# Patient Record
Sex: FEMALE | Race: WHITE | NOT HISPANIC OR LATINO | Employment: FULL TIME | URBAN - METROPOLITAN AREA
[De-identification: names, ages, dates, MRNs, and addresses within clinical notes are randomized per-mention and may not be internally consistent; named-entity substitution may affect disease eponyms.]

---

## 2019-09-12 ENCOUNTER — APPOINTMENT (EMERGENCY)
Dept: RADIOLOGY | Facility: HOSPITAL | Age: 54
End: 2019-09-12
Payer: COMMERCIAL

## 2019-09-12 ENCOUNTER — HOSPITAL ENCOUNTER (EMERGENCY)
Facility: HOSPITAL | Age: 54
Discharge: HOME | End: 2019-09-12
Attending: EMERGENCY MEDICINE | Admitting: EMERGENCY MEDICINE
Payer: COMMERCIAL

## 2019-09-12 VITALS
BODY MASS INDEX: 34.16 KG/M2 | SYSTOLIC BLOOD PRESSURE: 159 MMHG | DIASTOLIC BLOOD PRESSURE: 70 MMHG | OXYGEN SATURATION: 96 % | WEIGHT: 174 LBS | HEART RATE: 74 BPM | HEIGHT: 60 IN | TEMPERATURE: 97.6 F | RESPIRATION RATE: 18 BRPM

## 2019-09-12 DIAGNOSIS — R55 NEAR SYNCOPE: Primary | ICD-10-CM

## 2019-09-12 LAB
ALBUMIN SERPL BCP-MCNC: 3.4 G/DL (ref 3.5–5)
ALP SERPL-CCNC: 91 U/L (ref 46–116)
ALT SERPL W P-5'-P-CCNC: 28 U/L (ref 12–78)
ANION GAP SERPL CALCULATED.3IONS-SCNC: 11 MMOL/L (ref 4–13)
AST SERPL W P-5'-P-CCNC: 32 U/L (ref 5–45)
ATRIAL RATE: 76 BPM
BACTERIA UR QL AUTO: ABNORMAL /HPF
BASOPHILS # BLD AUTO: 0.02 THOUSANDS/ΜL (ref 0–0.1)
BASOPHILS NFR BLD AUTO: 0 % (ref 0–1)
BILIRUB SERPL-MCNC: 0.3 MG/DL (ref 0.2–1)
BILIRUB UR QL STRIP: NEGATIVE
BUN SERPL-MCNC: 15 MG/DL (ref 5–25)
CALCIUM SERPL-MCNC: 9.1 MG/DL (ref 8.3–10.1)
CHLORIDE SERPL-SCNC: 106 MMOL/L (ref 100–108)
CLARITY UR: ABNORMAL
CO2 SERPL-SCNC: 26 MMOL/L (ref 21–32)
COLOR UR: YELLOW
CREAT SERPL-MCNC: 0.73 MG/DL (ref 0.6–1.3)
EOSINOPHIL # BLD AUTO: 0.17 THOUSAND/ΜL (ref 0–0.61)
EOSINOPHIL NFR BLD AUTO: 3 % (ref 0–6)
ERYTHROCYTE [DISTWIDTH] IN BLOOD BY AUTOMATED COUNT: 12.8 % (ref 11.6–15.1)
GFR SERPL CREATININE-BSD FRML MDRD: 92 ML/MIN/1.73SQ M
GLUCOSE SERPL-MCNC: 105 MG/DL (ref 65–140)
GLUCOSE UR STRIP-MCNC: NEGATIVE MG/DL
HCT VFR BLD AUTO: 40.1 % (ref 34.8–46.1)
HGB BLD-MCNC: 13.1 G/DL (ref 11.5–15.4)
HGB UR QL STRIP.AUTO: ABNORMAL
HYALINE CASTS #/AREA URNS LPF: ABNORMAL /LPF
IMM GRANULOCYTES # BLD AUTO: 0.03 THOUSAND/UL (ref 0–0.2)
IMM GRANULOCYTES NFR BLD AUTO: 0 % (ref 0–2)
KETONES UR STRIP-MCNC: NEGATIVE MG/DL
LEUKOCYTE ESTERASE UR QL STRIP: ABNORMAL
LYMPHOCYTES # BLD AUTO: 1.7 THOUSANDS/ΜL (ref 0.6–4.47)
LYMPHOCYTES NFR BLD AUTO: 25 % (ref 14–44)
MCH RBC QN AUTO: 30.7 PG (ref 26.8–34.3)
MCHC RBC AUTO-ENTMCNC: 32.7 G/DL (ref 31.4–37.4)
MCV RBC AUTO: 94 FL (ref 82–98)
MONOCYTES # BLD AUTO: 0.39 THOUSAND/ΜL (ref 0.17–1.22)
MONOCYTES NFR BLD AUTO: 6 % (ref 4–12)
MUCOUS THREADS UR QL AUTO: ABNORMAL
NEUTROPHILS # BLD AUTO: 4.37 THOUSANDS/ΜL (ref 1.85–7.62)
NEUTS SEG NFR BLD AUTO: 66 % (ref 43–75)
NITRITE UR QL STRIP: NEGATIVE
NON-SQ EPI CELLS URNS QL MICRO: ABNORMAL /HPF
NRBC BLD AUTO-RTO: 0 /100 WBCS
P AXIS: 53 DEGREES
PH UR STRIP.AUTO: 6 [PH]
PLATELET # BLD AUTO: 214 THOUSANDS/UL (ref 149–390)
PMV BLD AUTO: 9.4 FL (ref 8.9–12.7)
POTASSIUM SERPL-SCNC: 4.3 MMOL/L (ref 3.5–5.3)
PR INTERVAL: 132 MS
PROT SERPL-MCNC: 7.5 G/DL (ref 6.4–8.2)
PROT UR STRIP-MCNC: ABNORMAL MG/DL
QRS AXIS: -39 DEGREES
QRSD INTERVAL: 94 MS
QT INTERVAL: 400 MS
QTC INTERVAL: 450 MS
RBC # BLD AUTO: 4.27 MILLION/UL (ref 3.81–5.12)
RBC #/AREA URNS AUTO: ABNORMAL /HPF
SODIUM SERPL-SCNC: 143 MMOL/L (ref 136–145)
SP GR UR STRIP.AUTO: 1.02 (ref 1–1.03)
T WAVE AXIS: 38 DEGREES
TROPONIN I SERPL-MCNC: <0.02 NG/ML
UROBILINOGEN UR QL STRIP.AUTO: 0.2 E.U./DL
VENTRICULAR RATE: 76 BPM
WBC # BLD AUTO: 6.68 THOUSAND/UL (ref 4.31–10.16)
WBC #/AREA URNS AUTO: ABNORMAL /HPF

## 2019-09-12 PROCEDURE — 93005 ELECTROCARDIOGRAM TRACING: CPT

## 2019-09-12 PROCEDURE — 71046 X-RAY EXAM CHEST 2 VIEWS: CPT

## 2019-09-12 PROCEDURE — 81001 URINALYSIS AUTO W/SCOPE: CPT | Performed by: EMERGENCY MEDICINE

## 2019-09-12 PROCEDURE — 80053 COMPREHEN METABOLIC PANEL: CPT | Performed by: EMERGENCY MEDICINE

## 2019-09-12 PROCEDURE — 87086 URINE CULTURE/COLONY COUNT: CPT | Performed by: EMERGENCY MEDICINE

## 2019-09-12 PROCEDURE — 85025 COMPLETE CBC W/AUTO DIFF WBC: CPT | Performed by: EMERGENCY MEDICINE

## 2019-09-12 PROCEDURE — 99285 EMERGENCY DEPT VISIT HI MDM: CPT | Performed by: EMERGENCY MEDICINE

## 2019-09-12 PROCEDURE — 99284 EMERGENCY DEPT VISIT MOD MDM: CPT

## 2019-09-12 PROCEDURE — 84484 ASSAY OF TROPONIN QUANT: CPT | Performed by: EMERGENCY MEDICINE

## 2019-09-12 PROCEDURE — 93010 ELECTROCARDIOGRAM REPORT: CPT | Performed by: INTERNAL MEDICINE

## 2019-09-12 PROCEDURE — 36415 COLL VENOUS BLD VENIPUNCTURE: CPT

## 2019-09-12 PROCEDURE — 87147 CULTURE TYPE IMMUNOLOGIC: CPT | Performed by: EMERGENCY MEDICINE

## 2019-09-12 NOTE — ED PROVIDER NOTES
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  Pt hadn't had breakfast, so had a few bites of a pop tart and felt better, so went back to work  While stocking the magazines, started to become more lightheaded and felt like she was going to pass out  Sat of the floor and waited for it to pass, but symptoms didn't pass  Other employee noted her on the floor and got manager  Was given some orange juice and ate some more of her pop tart, but became nauseated and vomited the juice and pop tart  Felt better afterward  Denies sig cp/pressure, no sob/bacon  States when younger, would pass out from hypoglycemia  Pt also notes she has gotten lightheaded before w/ UTIs  Does note some burning w/ urination, but denies frequency, urgency  No other complaints  Currently feels fine  History provided by:  Patient   used: No    Dizziness   Quality:  Lightheadedness  Severity:  Moderate  Onset quality:  Gradual  Timing:  Intermittent  Progression:  Waxing and waning  Chronicity:  New  Context: standing up    Relieved by:  None tried  Worsened by:  Nothing  Ineffective treatments:  None tried  Associated symptoms: nausea and vomiting    Associated symptoms: no chest pain, no diarrhea, no hearing loss, no palpitations, no shortness of breath and no weakness    Risk factors: no new medications        None       Past Medical History:   Diagnosis Date    Depression     Disease of thyroid gland     Hyperlipidemia     Hypertension        Past Surgical History:   Procedure Laterality Date    TONSILLECTOMY         History reviewed  No pertinent family history  I have reviewed and agree with the history as documented      Social History     Tobacco Use    Smoking status: Never Smoker    Smokeless tobacco: Never Used   Substance Use Topics    Alcohol use: Never     Frequency: Never    Drug use: Never        Review of Systems   HENT: Negative for hearing loss  Respiratory: Negative for shortness of breath  Cardiovascular: Negative for chest pain and palpitations  Gastrointestinal: Positive for nausea and vomiting  Negative for diarrhea  Neurological: Positive for dizziness  Negative for weakness  All other systems reviewed and are negative  Physical Exam  Physical Exam   Constitutional: She is oriented to person, place, and time  She appears well-developed and well-nourished  HENT:   Mouth/Throat: Oropharynx is clear and moist    Eyes: Conjunctivae are normal    Neck: Neck supple  Cardiovascular: Normal rate and regular rhythm  No murmur heard  Pulmonary/Chest: Effort normal and breath sounds normal  No respiratory distress  Abdominal: Soft  There is no tenderness  There is no guarding  Musculoskeletal: She exhibits no edema, tenderness or deformity  Neurological: She is alert and oriented to person, place, and time  She has normal strength  No cranial nerve deficit or sensory deficit  Skin: Skin is warm  Psychiatric: She has a normal mood and affect  Nursing note and vitals reviewed        Vital Signs  ED Triage Vitals   Temperature Pulse Respirations Blood Pressure SpO2   09/12/19 1029 09/12/19 1029 09/12/19 1029 09/12/19 1029 09/12/19 1029   97 6 °F (36 4 °C) 72 18 159/84 98 %      Temp src Heart Rate Source Patient Position - Orthostatic VS BP Location FiO2 (%)   -- 09/12/19 1115 09/12/19 1115 09/12/19 1115 --    Monitor Lying Right arm       Pain Score       09/12/19 1039       7           Vitals:    09/12/19 1140 09/12/19 1141 09/12/19 1142 09/12/19 1330   BP: (!) 174/82 (!) 171/80 165/79 159/70   Pulse: 82 80 75 74   Patient Position - Orthostatic VS: Lying - Orthostatic VS Sitting - Orthostatic VS Standing for 3 minutes - Orthostatic VS Lying         Visual Acuity  Visual Acuity      Most Recent Value   L Pupil Size (mm)  2   R Pupil Size (mm)  2          ED Medications  Medications - No data to display    Diagnostic Studies  Results Reviewed     Procedure Component Value Units Date/Time    Urine Microscopic [[id number]]  (Abnormal) Collected:  09/12/19 1144    Lab Status:  Final result Specimen:  Urine, Clean Catch Updated:  09/12/19 1217     RBC, UA 0-1 /hpf      WBC, UA 30-50 /hpf      Epithelial Cells Moderate /hpf      Bacteria, UA Occasional /hpf      Hyaline Casts, UA 1-2 /lpf      MUCUS THREADS Occasional    Urine culture [[id number]] Collected:  09/12/19 1144    Lab Status:   In process Specimen:  Urine, Clean Catch Updated:  09/12/19 1217    UA w Reflex to Microscopic w Reflex to Culture [[id number]]  (Abnormal) Collected:  09/12/19 1144    Lab Status:  Final result Specimen:  Urine, Clean Catch Updated:  09/12/19 1200     Color, UA Yellow     Clarity, UA Slightly Cloudy     Specific [place], UA 1 020     pH, UA 6 0     Leukocytes, UA Moderate     Nitrite, UA Negative     Protein, UA 30 (1+) mg/dl      Glucose, UA Negative mg/dl      Ketones, UA Negative mg/dl      Urobilinogen, UA 0 2 E U /dl      Bilirubin, UA Negative     Blood, UA Trace-Intact    Troponin I [[id number]]  (Normal) Collected:  09/12/19 1042    Lab Status:  Final result Specimen:  Blood from Arm, Left Updated:  09/12/19 1111     Troponin I <0 02 ng/mL     Comprehensive metabolic panel [[id number]]  (Abnormal) Collected:  09/12/19 1042    Lab Status:  Final result Specimen:  Blood from Arm, Left Updated:  09/12/19 1109     Sodium 143 mmol/L      Potassium 4 3 mmol/L      Chloride 106 mmol/L      CO2 26 mmol/L      ANION GAP 11 mmol/L      BUN 15 mg/dL      Creatinine 0 73 mg/dL      Glucose 105 mg/dL      Calcium 9 1 mg/dL      AST 32 U/L      ALT 28 U/L      Alkaline Phosphatase 91 U/L      Total Protein 7 5 g/dL      Albumin 3 4 g/dL      Total Bilirubin 0 30 mg/dL      eGFR 92 ml/min/1 73sq m     Narrative:       National Kidney Disease Foundation guidelines for Chronic Kidney Disease (CKD):     Stage 1 with normal or high GFR (GFR > 90 mL/min/1 73 square meters)    Stage 2 Mild CKD (GFR = 60-89 mL/min/1 73 square meters)    Stage 3A Moderate CKD (GFR = 45-59 mL/min/1 73 square meters)    Stage 3B Moderate CKD (GFR = 30-44 mL/min/1 73 square meters)    Stage 4 Severe CKD (GFR = 15-29 mL/min/1 73 square meters)    Stage 5 End Stage CKD (GFR <15 mL/min/1 73 square meters)  Note: GFR calculation is accurate only with a steady state creatinine    CBC and differential [[id number]] Collected:  09/12/19 1042    Lab Status:  Final result Specimen:  Blood from Arm, Left Updated:  09/12/19 1055     WBC 6 68 Thousand/uL      RBC 4 27 Million/uL      Hemoglobin 13 1 g/dL      Hematocrit 40 1 %      MCV 94 fL      MCH 30 7 pg      MCHC 32 7 g/dL      RDW 12 8 %      MPV 9 4 fL      Platelets 443 Thousands/uL      nRBC 0 /100 WBCs      Neutrophils Relative 66 %      Immat GRANS % 0 %      Lymphocytes Relative 25 %      Monocytes Relative 6 %      Eosinophils Relative 3 %      Basophils Relative 0 %      Neutrophils Absolute 4 37 Thousands/µL      Immature Grans Absolute 0 03 Thousand/uL      Lymphocytes Absolute 1 70 Thousands/µL      Monocytes Absolute 0 39 Thousand/µL      Eosinophils Absolute 0 17 Thousand/µL      Basophils Absolute 0 02 Thousands/µL                  XR chest 2 views   ED Interpretation by Mami Ragsdale DO (09/12 1248)   No acute findings      Final Result by Mari Conteh MD (09/12 1301)      No acute cardiopulmonary disease              Workstation performed: [id number]                    Procedures  ECG 12 Lead Documentation Only  Date/Time: 9/12/2019 10:40 AM  Performed by: Mami Ragsdale DO  Authorized by: Mami Ragsdale DO     ECG reviewed by me, the ED Provider: yes    Patient location:  ED  Previous ECG:     Previous ECG: Unavailable  Interpretation:     Interpretation: non-specific    Rate:     ECG rate:  76    ECG rate assessment: normal    Rhythm:     Rhythm: sinus rhythm    Ectopy:     Ectopy: none    QRS:     QRS axis:  Left  ST segments:     ST segments:  Normal  T waves:     T waves: normal             ED Course  ED Course as of Sep 12 1643   Thu Sep 12, 2019   1246 Pt tilt negative, w/u essentially negative  Urine w/ moderate epis so will await culture  No acute EKG findings  Will po challenge and if tolerating po will d/c home                                   MDM  Number of Diagnoses or Management Options  Near syncope: new and requires workup     Amount and/or Complexity of Data Reviewed  Clinical lab tests: reviewed and ordered  Tests in the radiology section of CPT®: ordered and reviewed  Tests in the medicine section of CPT®: reviewed and ordered  Obtain history from someone other than the patient: yes  Independent visualization of images, tracings, or specimens: yes        Disposition  Final diagnoses:   Near syncope     Time reflects when diagnosis was documented in both MDM as applicable and the Disposition within this note     Time User Action Codes Description Comment    9/12/2019  1:16 PM Sherren Larger L Add [R55] Near syncope       ED Disposition     ED Disposition Condition Date/Time Comment    Discharge Stable Thu Sep 12, 2019  1:16 PM Jerrell Galan discharge to home/self care  Follow-up Information     Follow up With Specialties Details Why [address], DO Family Medicine Schedule an appointment as soon as possible for a visit  If symptoms worsen [address] [place] [address]  [phone]            There are no discharge medications for this patient  No discharge procedures on file      ED Provider  Electronically Signed by           Christiane Grande DO  09/12/19 9356

## 2019-09-13 LAB — BACTERIA UR CULT: ABNORMAL

## 2022-09-06 ENCOUNTER — TELEMEDICINE (OUTPATIENT)
Dept: BEHAVIORAL/MENTAL HEALTH CLINIC | Facility: CLINIC | Age: 57
End: 2022-09-06
Payer: COMMERCIAL

## 2022-09-06 DIAGNOSIS — F41.1 GENERALIZED ANXIETY DISORDER: Primary | ICD-10-CM

## 2022-09-06 DIAGNOSIS — Z63.8 STRESS DUE TO FAMILY TENSION: ICD-10-CM

## 2022-09-06 DIAGNOSIS — F33.9 EPISODE OF RECURRENT MAJOR DEPRESSIVE DISORDER, UNSPECIFIED DEPRESSION EPISODE SEVERITY (HCC): ICD-10-CM

## 2022-09-06 PROCEDURE — 90834 PSYTX W PT 45 MINUTES: CPT | Performed by: COUNSELOR

## 2022-09-06 SDOH — SOCIAL STABILITY - SOCIAL INSECURITY: OTHER SPECIFIED PROBLEMS RELATED TO PRIMARY SUPPORT GROUP: Z63.8

## 2022-09-07 ENCOUNTER — TELEPHONE (OUTPATIENT)
Dept: PSYCHIATRY | Facility: CLINIC | Age: 57
End: 2022-09-07

## 2022-09-07 DIAGNOSIS — F31.10 BIPOLAR AFFECTIVE DISORDER, CURRENT EPISODE MANIC, CURRENT EPISODE SEVERITY UNSPECIFIED (HCC): Primary | ICD-10-CM

## 2022-09-07 RX ORDER — ARIPIPRAZOLE 5 MG/1
TABLET ORAL
Qty: 45 TABLET | Refills: 1 | Status: SHIPPED | OUTPATIENT
Start: 2022-09-07

## 2022-09-07 NOTE — TELEPHONE ENCOUNTER
Aripiprazole 5mg 60 per 30 days Denied by INS stating it does not meet their rules for antipsychotics  Plan limit of 45 per 30 days  In order for drug quantity to be approved doctor must:   ~Give medical reason why 10mg tablet would not work well for patient (after first week)  ~give medical reason why quantity allowed would not work  Decision will take effect on 9/15  Routing to Dr Thang Rabago

## 2022-09-12 PROBLEM — Z63.8 STRESS DUE TO FAMILY TENSION: Status: ACTIVE | Noted: 2022-09-12

## 2022-09-12 PROBLEM — F41.1 GENERALIZED ANXIETY DISORDER: Status: ACTIVE | Noted: 2022-09-12

## 2022-09-12 NOTE — PSYCH
Virtual Regular Visit    Verification of patient location:    Patient is located in the following state in which I hold an active license [place]      Assessment/Plan:    Problem List Items Addressed This Visit        Other    Stress due to family tension    Generalized anxiety disorder - Primary      Other Visit Diagnoses     Episode of recurrent major depressive disorder, unspecified depression episode severity ([facility] [address] )              Goals addressed in session: Goal 2          Reason for visit is No chief complaint on file  Encounter provider Michelle Zuñiga, [facility]    Provider located at [address] [place] [address]  [phone]      Recent Visits  No visits were found meeting these conditions  Showing recent visits within past 7 days and meeting all other requirements  Future Appointments  No visits were found meeting these conditions  Showing future appointments within next 150 days and meeting all other requirements       The patient was identified by name and date of birth  Dougedy Karonjyoti was informed that this is a telemedicine visit and that the visit is being conducted throughTelephone  My office door was closed  No one else was in the room  She acknowledged consent and understanding of privacy and security of the video platform  The patient has agreed to participate and understands they can discontinue the visit at any time  Patient is aware this is a billable service  Karina Chatterjee is a 59 y o  female    HPI     Past Medical History:   Diagnosis Date    Depression     Disease of thyroid gland     Hyperlipidemia     Hypertension        Past Surgical History:   Procedure Laterality Date    TONSILLECTOMY         Current Outpatient Medications   Medication Sig Dispense Refill    ARIPiprazole (ABILIFY) 5 mg tablet Aripiprazole 5 m tablet by mouth per day x 1 wk   Then 1 5 tablet per day   45 tablet 1     No current facility-administered medications for this visit  No Known Allergies    Review of Systems    Video Exam    There were no vitals filed for this visit  Physical Exam     I spent 38 minutes directly with the patient during this visit     D: 1:07pm-1:45pm  Client was late to session  Client was unable to meet via [place]  Therapist and client discussed how therapist will be unable to continue to meet via telephone, and client agreed to try to come for in-person session visits  Therapist and client discussed her family's recent visit from [place] and how client felt as though she was not considered during certain family events, and as though she was left out of the final dinner as a family  Therapist and client continued to discuss client's family dynamics, focusing on those impacted by Tristin's decision to steal from his grandmother and aunt  Therapist encouraged client to view this incident from the point of view of her mother and sister, and pointed out Tristin's lack of taking accountability for stealing and his not attempting to make amends  A: Client appeared to be oriented x3  Client appeared to be frustrated that her mother and sister still do not want Tristin to be in their houses  P: Therapist will meet with client again in 2 weeks, and will continue to process client's family dynamics

## 2022-09-16 ENCOUNTER — DOCUMENTATION (OUTPATIENT)
Dept: PSYCHIATRY | Facility: CLINIC | Age: 57
End: 2022-09-16

## 2022-09-19 ENCOUNTER — SOCIAL WORK (OUTPATIENT)
Dept: BEHAVIORAL/MENTAL HEALTH CLINIC | Facility: CLINIC | Age: 57
End: 2022-09-19
Payer: COMMERCIAL

## 2022-09-19 DIAGNOSIS — F41.1 GENERALIZED ANXIETY DISORDER: Primary | ICD-10-CM

## 2022-09-19 DIAGNOSIS — Z63.8 STRESS DUE TO FAMILY TENSION: ICD-10-CM

## 2022-09-19 PROCEDURE — 90832 PSYTX W PT 30 MINUTES: CPT | Performed by: COUNSELOR

## 2022-09-19 SDOH — SOCIAL STABILITY - SOCIAL INSECURITY: OTHER SPECIFIED PROBLEMS RELATED TO PRIMARY SUPPORT GROUP: Z63.8

## 2022-09-20 NOTE — PSYCH
Psychotherapy Provided: Individual Psychotherapy 45 minutes     Length of time in session: 31 minutes, follow up in 2 week    Encounter Diagnosis     ICD-10-CM    1  Generalized anxiety disorder  F41 1    2  Stress due to family tension  Z63 8        Goals addressed in session: Goal 2 and Goal 3      Pain:      none    0    Current suicide risk : Low     D: 1:15pm-1:46pm  Client was late to session  This was therapist and client's first in-person session  Therapist and client continued to discuss and process client's relationship with her mother and sister  Therapist and client discussed client's anxiety with regards to family stress and relationships  A: Client appeared to be oriented x3  Client appeared to feel anxious and stressed due to being late to session, but appeared to calm as the session progressed  P: Therapist and client will meet in 2 weeks, and will continue to discuss self-care  Behavioral Health Treatment Plan [facility]: Diagnosis and Treatment Plan explained to [facility] relates understanding diagnosis and is agreeable to Treatment Plan   Yes

## 2022-10-17 ENCOUNTER — SOCIAL WORK (OUTPATIENT)
Dept: BEHAVIORAL/MENTAL HEALTH CLINIC | Facility: CLINIC | Age: 57
End: 2022-10-17
Payer: COMMERCIAL

## 2022-10-17 DIAGNOSIS — F41.1 GENERALIZED ANXIETY DISORDER: Primary | ICD-10-CM

## 2022-10-17 DIAGNOSIS — Z63.8 STRESS DUE TO FAMILY TENSION: ICD-10-CM

## 2022-10-17 PROCEDURE — 90834 PSYTX W PT 45 MINUTES: CPT | Performed by: COUNSELOR

## 2022-10-17 SDOH — SOCIAL STABILITY - SOCIAL INSECURITY: OTHER SPECIFIED PROBLEMS RELATED TO PRIMARY SUPPORT GROUP: Z63.8

## 2022-10-18 NOTE — PSYCH
Psychotherapy Provided: Individual Psychotherapy 45 minutes     Length of time in session: 43 minutes, follow up in 2 week    Encounter Diagnosis     ICD-10-CM    1  Generalized anxiety disorder  F41 1    2  Stress due to family tension  Z63 8        Goals addressed in session: Goal 1 and Goal 2     Pain:      none    0    Current suicide risk : Low     D: Therapist and client continued to discuss and process client's relationship with her son  Client stated that she continues to ask her son for proof of employment, and he continues to tell her that he doesn't make her prove to him that she is employed  Client stated that she does not believe that he is working, however, therapist pointed out to client that she also continues to give credit to some of his excuses for why he has to ask for money from her  Therapist encouraged client to explore why she continues to choose to accept his excuses if she does not believe them, what that means to her if her son is taking advantage of her  Therapist pointed out to client that at the beginning of the session, client stated "nothing changes unless I change," but then something holds her back from making changes  A: Client appeared to be oriented x3  Client appeared to be irritated with the idea that her son may be taking advantage of her, and depressed that she has been unable to motivate herself enough in order to make changes  P: Therapist and client will meet again in 2 weeks, and will continue to explore what holds client back from making changes  Behavioral Health Treatment Plan [facility]: Diagnosis and Treatment Plan explained to Bettie Reed relates understanding diagnosis and is agreeable to Treatment Plan   Yes     Visit Time    Visit Start Time: 1:08 PM  Visit Stop Time: 1:51 PM  Total Visit Duration: 43 minutes

## 2022-11-14 ENCOUNTER — SOCIAL WORK (OUTPATIENT)
Dept: BEHAVIORAL/MENTAL HEALTH CLINIC | Facility: CLINIC | Age: 57
End: 2022-11-14

## 2022-11-14 DIAGNOSIS — F41.1 GENERALIZED ANXIETY DISORDER: Primary | ICD-10-CM

## 2022-11-14 NOTE — PSYCH
Client called on the day of her appointment to say she did not get an appointment reminder, and that she needed to cancel her appointment if she had one  Therapist called client who confirmed she did not get a reminder, and was called into work  Therapist confirmed client's next appointment  Alison stevenson cancelled her  11/14/22 appointment , staff called and spoke with client  Treatment Plan not due at this session

## 2022-11-28 ENCOUNTER — SOCIAL WORK (OUTPATIENT)
Dept: BEHAVIORAL/MENTAL HEALTH CLINIC | Facility: CLINIC | Age: 57
End: 2022-11-28

## 2022-11-28 DIAGNOSIS — Z63.8 STRESS DUE TO FAMILY TENSION: Primary | ICD-10-CM

## 2022-11-28 DIAGNOSIS — F33.9 EPISODE OF RECURRENT MAJOR DEPRESSIVE DISORDER, UNSPECIFIED DEPRESSION EPISODE SEVERITY (HCC): ICD-10-CM

## 2022-11-28 PROBLEM — F32.A DEPRESSION: Status: ACTIVE | Noted: 2022-11-28

## 2022-11-28 SDOH — SOCIAL STABILITY - SOCIAL INSECURITY: OTHER SPECIFIED PROBLEMS RELATED TO PRIMARY SUPPORT GROUP: Z63.8

## 2022-11-28 NOTE — PSYCH
Psychotherapy Provided: Individual Psychotherapy 45 minutes     Length of time in session: 45 minutes, follow up in 2 week    Encounter Diagnosis     ICD-10-CM    1  Stress due to family tension  Z63 8       2  Episode of recurrent major depressive disorder, unspecified [facility] episode [facility] ([facility] )  F33 9           Goals addressed in session: Goal 1     Pain:      none    0    Current suicide risk : Low     D: Client shared that she has been experiencing a lack of motivation  Therapist spoke with client about using self-care, and pushing through with small steps  Client yawned approximately every minute throughout the entirety of session  Client stated that she hasn't been sleeping due to her restless leg  Client shared that her PHP took her off her Abilify cold-turkey  Therapist provided psycho-education on the importance of taking her prescribed medication, not stopping cold-turkey, and speaking to her psychiatrist before making medication changes with regards to her psychiatric medications  A: Client appeared to be oriented x3  Client appeared to be very tired, as evidenced by client yawning very frequently throughout session  P: Therapist and client will meet again in 2 weeks, and will continue to discuss her motivation level  Behavioral Health Treatment Plan [facility]: Diagnosis and Treatment Plan explained to Suzi Nj relates understanding diagnosis and is agreeable to Treatment Plan   Yes     Visit start and stop times:    11/28/22  Start Time: 1301  Stop Time: 1346  Total Visit Time: 45 minutes

## 2022-12-02 NOTE — PSYCH
[facility]/Hospital: [address] [facility]   [address] [facility]    Psychiatric Progress Note  MRN#: [id number]  Ann Baker 59 y o  female    This note was not shared with the patient due to reasonable likelihood of causing patient harm   This Patient was seen in the office today at [place] [address] location  Subjective:  Ann started Abilify 5mg did not increase to 10mg  - not able to sleep , tried, cramping legs at night , legs restless at night , L>R  Without pain  MSE ; slighlty pressured  Also on sythroid denies taking too much  ROS   Anixiety - worries  Psychiosis- no    ROS: Pertinent items are noted in HPI  SI/HI  No      Mental Status Evaluation:  General Appearance:  Bulmaro Pro is a 59 y o   female casually dressed, adequate hygiene and grooming, looks stated age  Wearing glasses   Behavior:  pleasant, cooperative, fair eye contact   Speech:  Slightly rapid speech/talkative ,WNL, rhythm, volume, latency, amount   Mood:  anxious   Affect:  mood-congruent slightly anxious    Thought Process:  normal and logical   Thought Content:  no overt delusions, somatic preoccupation   Perceptual Disturbances: no auditory hallucinations, no visual hallucinations, Does not appear responding or preoccupied   Delusions  No   Risk Potential: Suicidal Ideations No  Homicidal Ideations No  Potential for Aggression No     Sensorium:  Oriented to person, place, time/date and situation   Memory:  recent and remote memory grossly intact   Consciousness:  alert and awake   Attention: attention span and concentration are age appropriate   Insight:  fair   Judgment: fair   Gait/Station: normal   Motor Activity: no abnormal movements     There were no vitals filed for this visit       Medications:   Current Outpatient Medications on File Prior to Visit   Medication Sig Dispense Refill   • ARIPiprazole (ABILIFY) 5 mg tablet Aripiprazole 5 m tablet by mouth per day x 1 wk  Then 1 5 tablet per day   45 tablet 1     No current facility-administered medications on file prior to visit  Labs: I have personally reviewed all pertinent laboratory/tests results  Most Recent Labs:   Lab Results   Component Value Date    WBC 6 68 2019    RBC 4 27 2019    HGB 13 1 2019    HCT 40 1 2019     2019    RDW 12 8 2019    NEUTROABS 4 37 2019    SODIUM 143 2019    K 4 3 2019     2019    CO2 26 2019    BUN 15 2019    CREATININE 0 73 2019    GLUC 105 2019    CALCIUM 9 1 2019    AST 32 2019    ALT 28 2019    ALKPHOS 91 2019    TP 7 5 2019    ALB 3 4 (L) 2019    TBILI 0 30 2019    HGBA1C 6 2 (H) 2021     2021     ________________________________________________________________________    A/P  Ann, 59 yr old female with h/o comorbid medical conditions, presented for depression and anxiety- generalized,  hopelessness linked to familial strife  Interim events,  precipitated shaq via lexapro (  irritability, limited sleep, MSE: pressured speech, increased energy, distracted)  Case complicated by restless likely due to Abilify although there's pending sleep study to rule out RLS via [address] provider  Today, finding of somewhat pressure speech, reported limited sleep , anxiety worries       DSM 5:  Medication Induced Bipolar Disorder  Generalized Anxiety Disorder  Major Depressive Disorder without psychotic features  Rule out Bipolar Disorder      Medical Problem:  Hypothyroid  High Cholesterol  S/p MI and bypass  Rule out RLS, pending sleep study      Plan:  Discussed diagnostic impression based on clinical findings  Discontinued Abilify 5 mg  Discontinue Lexapro 10 mg, was instructed to taper by half for 1 wk and to stop drug  Trazodone 50-100mg for sleep  Ann was informed of plans  to discuss  alternative options for mood disorder/bipolar disorder during next appointment  at [facility]      Risks, benefits, and possible side effects of medications explained to patient and patient verbalizes understanding  Next appointment: 1 wk      Today's Appointment@ [facility]  Face To Face:  12:00 PM -12:20 PM;Documentation Time:  4:35 PM- 4:41 PM    Encounter Duration: Time Spent 20 minutes with Patient  Greater than 50% of total time was spent with the patient

## 2022-12-06 ENCOUNTER — OFFICE VISIT (OUTPATIENT)
Dept: PSYCHIATRY | Facility: CLINIC | Age: 57
End: 2022-12-06

## 2022-12-06 DIAGNOSIS — F19.94 SUBSTANCE OR MEDICATION-INDUCED BIPOLAR AND RELATED DISORDER (HCC): Primary | ICD-10-CM

## 2022-12-06 RX ORDER — ATORVASTATIN CALCIUM 80 MG/1
80 TABLET, FILM COATED ORAL
COMMUNITY
Start: 2022-10-31

## 2022-12-06 RX ORDER — ESCITALOPRAM OXALATE 10 MG/1
10 TABLET ORAL DAILY
COMMUNITY
Start: 2022-12-02 | End: 2022-12-06

## 2022-12-06 RX ORDER — TRAZODONE HYDROCHLORIDE 50 MG/1
TABLET ORAL
Qty: 180 TABLET | Refills: 0 | Status: SHIPPED | OUTPATIENT
Start: 2022-12-06

## 2022-12-06 RX ORDER — TRAZODONE HYDROCHLORIDE 50 MG/1
50-100 TABLET ORAL
COMMUNITY
Start: 2022-11-08 | End: 2022-12-06 | Stop reason: SDUPTHER

## 2022-12-06 RX ORDER — ROPINIROLE 0.25 MG/1
0.25 TABLET, FILM COATED ORAL
COMMUNITY
Start: 2022-10-18

## 2022-12-06 RX ORDER — METOPROLOL SUCCINATE 25 MG/1
25 TABLET, EXTENDED RELEASE ORAL 2 TIMES DAILY
COMMUNITY
Start: 2022-11-08

## 2022-12-06 RX ORDER — LEVOTHYROXINE SODIUM 0.03 MG/1
25 TABLET ORAL DAILY
COMMUNITY
Start: 2022-11-25

## 2022-12-12 ENCOUNTER — SOCIAL WORK (OUTPATIENT)
Dept: BEHAVIORAL/MENTAL HEALTH CLINIC | Facility: CLINIC | Age: 57
End: 2022-12-12

## 2022-12-12 DIAGNOSIS — F33.9 EPISODE OF RECURRENT MAJOR DEPRESSIVE DISORDER, UNSPECIFIED DEPRESSION EPISODE SEVERITY (HCC): Primary | ICD-10-CM

## 2022-12-13 NOTE — PSYCH
Psychotherapy Provided: Individual Psychotherapy 45 minutes     Length of time in session: 40 minutes, follow up in 2 week    Encounter Diagnosis     ICD-10-CM    1  Episode of recurrent major depressive disorder, unspecified depression episode severity ([facility] [address] )  F33 9           Goals addressed in session: Goal 1     Pain:      none    0    Current suicide risk : Low     D: Client shared that she has been having trouble falling asleep, and has been dealing with "restless leg syndrome " Client stated that she has not been using her CPAP machine because it is hard to get comfortable  Therapist recommended that client reach out to her CPAP provider to ask about a different type of mask, as well as make sure to let her psychiatrist know that she is struggling with sleep  Client was yawning throughout session  Client explained that she does not have much of a sleep routine  Therapist provided psycho-education on the benefits of having a bedtime routine with regards to sleep preparation  Client appeared hesitant to try to implement suggestions therapist had of ways to help with her sleep and building a bedtime routine, which therapist addressed with client  Therapist normalized that it is difficult to establish new routines  A: Client appeared to be oriented x3  Client appeared to be tired as evidence by her yawning throughout session  Client appeared resistant to trying to implement changes to her patterns around sleep, such as eliminating blue screens at bedtime  P: Therapist and client will meet again in 4 weeks due to scheduling, and will continue to address client's concerns around sleep and how that may be impacting her mood  Behavioral Health Treatment Plan [facility]: Diagnosis and Treatment Plan explained to Sunil Olivo relates understanding diagnosis and is agreeable to Treatment Plan   Yes     Visit start and stop times:    12/12/22  Start Time: 1603  Stop Time: 1643  Total Visit Time: 40 minutes

## 2022-12-17 NOTE — PATIENT INSTRUCTIONS
Gomez Heimlich [id number]   Thanks for presenting to today's Appointment at [facility]  Ann Risperidone and Lamotrigine was started for mood stabilization

## 2022-12-17 NOTE — PSYCH
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, and decoration for [employer]  And tree not decorated  ROS   SI/HI- no  Depression- slightly about life  Anhedonia- does not feel like doing puzzle anymore, less motivation   Anxious about "having to find a job", worries  Sleeping- 6-7 hrs, although hard time falling to sleep, leg restless , mind wanders about her life , situation with son or mom  Also Ann thinking about her dad a lot and her mom  Cognitively- sometimes difficulties focusing hard time falling to sleep 'cause jump from 1 thing to another   Energy-  intermittent pacing at night , can't sit still  Psychiosis- denies paranoia or hallucinations  Hopelessness/Guility- no  Appetite - stable    Duration of symptoms for a while prior to preivous appointment         ROS: Pertinent items are noted in HPI  SI/HI  No      Mental Status Evaluation:  General Appearance:  Joseph Faria is a 59 y o   female casually dressed, adequate hygiene and grooming, looks stated age   Wearing glasses   Behavior:  pleasant, cooperative, fair eye contact   Speech:  Slightly rapid speech/talkative ,WNL, rhythm, volume, latency, amount   Mood:  Depressed ,anxious   Affect:  mood-congruent slightly anxious , labile   Thought Process:  disorganized, flight of ideas, logical and tangential    Thought Content:  no overt delusions, somatic preoccupation   Perceptual Disturbances: no auditory hallucinations, no visual hallucinations, Does not appear responding or preoccupied   Delusions  No   Risk Potential: Suicidal Ideations No  Homicidal Ideations No  Potential for Aggression No     Sensorium:  Oriented to person, place, time/date and situation   Memory:  recent and remote memory grossly intact   Consciousness:  alert and awake   Attention: attention span and concentration are age appropriate   Insight:  fair   Judgment: fair   Gait/Station: normal   Motor Activity: no abnormal movements     There were no vitals filed for this visit  Medications:   Current Outpatient Medications on File Prior to Visit   Medication Sig Dispense Refill   • atorvastatin (LIPITOR) 80 mg tablet Take 80 mg by mouth daily at bedtime     • betamethasone valerate (VALISONE) 0 1 % ointment Apply 1 application topically 2 (two) times a day To affected areas     • levothyroxine 25 mcg tablet Take 25 mcg by mouth daily     • metoprolol succinate (TOPROL-XL) 25 mg 24 hr tablet Take 25 mg by mouth 2 (two) times a day     • rOPINIRole (REQUIP) 0 25 mg tablet Take 0 25 mg by mouth daily at bedtime     • traZODone (DESYREL) 50 mg tablet Trazodone 50m-2 tablet po at night 180 tablet 0     No current facility-administered medications on file prior to visit  Labs: I have personally reviewed all pertinent laboratory/tests results     Most Recent Labs:   Lab Results   Component Value Date    WBC 6 68 2019    RBC 4 27 2019    HGB 13 1 2019    HCT 40 1 2019     2019    RDW 12 8 2019    NEUTROABS 4 37 2019    SODIUM 143 2019    K 4 3 2019     2019    CO2 26 2019    BUN 15 2019    CREATININE 0 73 2019    GLUC 105 2019    CALCIUM 9 1 2019    AST 32 2019    ALT 28 2019    ALKPHOS 91 2019    TP 7 5 2019    ALB 3 4 (L) 2019    TBILI 0 30 2019 HGBA1C 6 2 (H) 2021     2021     ________________________________________________________________________    A/P  Ann, 59 yr old female with h/o comorbid medical conditions, presented for depression and anxiety- generalized,  hopelessness linked to familial strife  Interim events,  precipitated shaq via lexapro (  Irritability- to elated, limited sleep, MSE: pressured speech, increased energy, distracted)  Case complicated by restless likely due to Abilify ;pending sleep study to rule out RLS  Today, lowered lexapro to 5mg and stopped Abilify  MSE finding of continued shaq, anxiety although depressed affect and associated neurovegetative symptoms  DSM 5:  Major depressive disorder, recurrent severe without psychotic features ([facility] [address] )  Bipolar Disorder,Otherwise     Medical Problem:  Hypothyroid  High Cholesterol  S/p MI and bypass  Rule out RLS, pending sleep study      Plan:  Discussed diagnostic impression based on clinical findings, external records reviewed   Was encouraged again to discontinue Lexapro  Started lamotrigine 25mg  Started Risperidone 1mg to 2mg  Ann other medications were not changed     Medication Ordered During Today's Encounter:  -     risperiDONE (RisperDAL) 1 mg tablet; Risperidone 1 m tablet po night x  3 days  Then 2 tablet po at night  -     lamoTRIgine (LaMICtal) 25 mg tablet; Lamotrigine 25m tablet po per day x 2wks  Then 2 tablet po per day x 2 wks  Orders Placed This Encounter   Procedures   • Lipid panel   • Comprehensive metabolic panel   • Hemoglobin A1C   • Lipid panel   • CBC and differential   • Comprehensive metabolic panel   • TSH, 3rd generation with Free T4 reflex         Risks, benefits, and possible side effects of medications explained to patient and patient verbalizes understanding         Next appointment: 3 wk    Today's Appointment@ [facility]  Face To Face:  12:04 PM -12:51 PM;Documentation Time:  5:22 PM- 5:40 PM     Encounter Duration: Time Spent 47 minutes with Patient  Greater than 50% of total time was spent with the patient     MDM  Number of Diagnoses or Management Options  Major depressive disorder, recurrent severe without psychotic features ([facility] [address] ): new, no workup  Otherwise Bipolar Disorder : new, no workup     Amount and/or Complexity of Data Reviewed  Clinical lab tests: ordered  Review and summarize past medical records: yes    Risk of Complications, Morbidity, and/or Mortality  Presenting problems: moderate  Management options: high    Critical Care  Total time providing critical care: 30-74 minutes

## 2022-12-20 ENCOUNTER — OFFICE VISIT (OUTPATIENT)
Dept: PSYCHIATRY | Facility: CLINIC | Age: 57
End: 2022-12-20

## 2022-12-20 DIAGNOSIS — Z79.899 ENCOUNTER FOR LONG-TERM (CURRENT) USE OF OTHER MEDICATIONS: ICD-10-CM

## 2022-12-20 DIAGNOSIS — F33.2 MAJOR DEPRESSIVE DISORDER, RECURRENT SEVERE WITHOUT PSYCHOTIC FEATURES (HCC): Primary | ICD-10-CM

## 2022-12-20 DIAGNOSIS — F31.30 BIPOLAR AFFECTIVE DISORDER, CURRENT EPISODE DEPRESSED, CURRENT EPISODE SEVERITY UNSPECIFIED (HCC): ICD-10-CM

## 2022-12-20 RX ORDER — LAMOTRIGINE 25 MG/1
TABLET ORAL
Qty: 60 TABLET | Refills: 1 | Status: SHIPPED | OUTPATIENT
Start: 2022-12-20

## 2022-12-20 RX ORDER — RISPERIDONE 1 MG/1
TABLET ORAL
Qty: 30 TABLET | Refills: 1 | Status: SHIPPED | OUTPATIENT
Start: 2022-12-20

## 2022-12-23 ENCOUNTER — TELEPHONE (OUTPATIENT)
Dept: PSYCHIATRY | Facility: CLINIC | Age: 57
End: 2022-12-23

## 2022-12-23 NOTE — TELEPHONE ENCOUNTER
Client left a message for Dr Jenni Zafar that she is going to "stop new medication at night time that was recently prescribed as she is up all night " Attempted to call back client for more information, and voice mail received  Left  that message would be sent to Dr Jenni Zafar on her return next week   Message sent to Dr Jenni Zafar

## 2022-12-27 NOTE — TELEPHONE ENCOUNTER
Pt Annmayur Rowe  1965 chart reviewed , otherwise bipolar disorder, MDD  There is suspicion of acute shaq- short symptoms  Was started on Lamotrigine and Risperidone  Spoke with Ann  - reported stopping both due to s/e's- increased urination and can't sleep, was up all night   Ann also recently + COVID and is in [place]  History of prior trail of Seroquel and did not like it  Ann was encouraged to not retrial Risperidone only when - COVID  She refused cogentin         This note was not shared with the patient due to reasonable likelihood of causing patient harm

## 2022-12-28 ENCOUNTER — TELEPHONE (OUTPATIENT)
Dept: PSYCHIATRY | Facility: CLINIC | Age: 57
End: 2022-12-28

## 2022-12-28 LAB
ALBUMIN SERPL-MCNC: 3.9 G/DL (ref 3.8–4.9)
ALBUMIN/GLOB SERPL: 1.5 {RATIO} (ref 1.2–2.2)
ALP SERPL-CCNC: 120 IU/L (ref 44–121)
ALT SERPL-CCNC: 34 IU/L (ref 0–32)
AST SERPL-CCNC: 53 IU/L (ref 0–40)
BASOPHILS # BLD AUTO: 0 X10E3/UL (ref 0–0.2)
BASOPHILS NFR BLD AUTO: 0 %
BILIRUB SERPL-MCNC: 0.4 MG/DL (ref 0–1.2)
BUN SERPL-MCNC: 18 MG/DL (ref 6–24)
BUN/CREAT SERPL: 20 (ref 9–23)
CALCIUM SERPL-MCNC: 8.5 MG/DL (ref 8.7–10.2)
CHLORIDE SERPL-SCNC: 106 MMOL/L (ref 96–106)
CHOLEST SERPL-MCNC: 116 MG/DL (ref 100–199)
CHOLEST/HDLC SERPL: 2.8 RATIO (ref 0–4.4)
CO2 SERPL-SCNC: 21 MMOL/L (ref 20–29)
CREAT SERPL-MCNC: 0.91 MG/DL (ref 0.57–1)
EGFR: 73 ML/MIN/1.73
EOSINOPHIL # BLD AUTO: 0 X10E3/UL (ref 0–0.4)
EOSINOPHIL NFR BLD AUTO: 0 %
ERYTHROCYTE [DISTWIDTH] IN BLOOD BY AUTOMATED COUNT: 13.3 % (ref 11.7–15.4)
EST. AVERAGE GLUCOSE BLD GHB EST-MCNC: 137 MG/DL
GLOBULIN SER-MCNC: 2.6 G/DL (ref 1.5–4.5)
GLUCOSE SERPL-MCNC: 105 MG/DL (ref 70–99)
HBA1C MFR BLD: 6.4 % (ref 4.8–5.6)
HCT VFR BLD AUTO: 38.2 % (ref 34–46.6)
HDLC SERPL-MCNC: 42 MG/DL
HGB BLD-MCNC: 13 G/DL (ref 11.1–15.9)
IMM GRANULOCYTES # BLD: 0 X10E3/UL (ref 0–0.1)
IMM GRANULOCYTES NFR BLD: 0 %
LDLC SERPL CALC-MCNC: 56 MG/DL (ref 0–99)
LYMPHOCYTES # BLD AUTO: 1.8 X10E3/UL (ref 0.7–3.1)
LYMPHOCYTES NFR BLD AUTO: 37 %
MCH RBC QN AUTO: 31.1 PG (ref 26.6–33)
MCHC RBC AUTO-ENTMCNC: 34 G/DL (ref 31.5–35.7)
MCV RBC AUTO: 91 FL (ref 79–97)
MONOCYTES # BLD AUTO: 0.5 X10E3/UL (ref 0.1–0.9)
MONOCYTES NFR BLD AUTO: 9 %
NEUTROPHILS # BLD AUTO: 2.6 X10E3/UL (ref 1.4–7)
NEUTROPHILS NFR BLD AUTO: 54 %
PLATELET # BLD AUTO: 152 X10E3/UL (ref 150–450)
POTASSIUM SERPL-SCNC: 4.1 MMOL/L (ref 3.5–5.2)
PROT SERPL-MCNC: 6.5 G/DL (ref 6–8.5)
RBC # BLD AUTO: 4.18 X10E6/UL (ref 3.77–5.28)
SL AMB T4, FREE (DIRECT): 1.11 NG/DL (ref 0.82–1.77)
SL AMB VLDL CHOLESTEROL CALC: 18 MG/DL (ref 5–40)
SODIUM SERPL-SCNC: 143 MMOL/L (ref 134–144)
TRIGL SERPL-MCNC: 91 MG/DL (ref 0–149)
TSH SERPL DL<=0.005 MIU/L-ACNC: 6.61 UIU/ML (ref 0.45–4.5)
WBC # BLD AUTO: 4.9 X10E3/UL (ref 3.4–10.8)

## 2022-12-28 NOTE — TELEPHONE ENCOUNTER
Pt Ann Rowe  1965 chart was reviewed  WNL labs 22- CBC diff, CMP, HgA1C, TSH  Ann was called and was informed;  Ann also schedule earlier appointment at [facility] within 1-2 wks      This note was not shared with the patient due to reasonable likelihood of causing patient harm

## 2023-01-06 NOTE — PSYCH
[facility]/Hospital: [address]   [address] [facility]    Psychiatric Progress Note  MRN#: [id number]  Ann Callejas Sadie 59 y o  female    This note was not shared with the patient due to reasonable likelihood of causing patient harm   This Patient was seen in the office today at [place] [address] location  Subjective:  External interim Risperidone was stopped due to s/e-urination problems  , and restlessness  No longer with symptoms  Today- Ann  - reported mood as depressed   Her dad  - stated mentally he was gone for a while, was in a home, although was physically around, now he's not  Hard coping with the lost  He  1 wk ago  Denies SI, plan or intent  Complaints of anxiety and some sadness- h/o heart attack and and fear of dying  Distracted a night while trying to sleep- requires television  At night , there's thoughts ,worries about what what's going on or missing her dad  Also fear of not waking up in the morning - due to prior heart attack and fear of dying in sleep  Discussed history of heart attack- occurred in the morning , called ambulance and then hospitalized x 2 wks  ROS : coughing ,Denies physical symptoms at night   Substance Hx:     illicit drugs -denies   Tobacco- denies  Alcohol- denies       Renatate see's Dr Alva Montenegro provider is planning to treat Ann RLS  - Ropinirole        Mental Status Evaluation:  General Appearance:  Evy Renteria is a 59 y o   female casually dressed, adequate hygiene and grooming, looks stated age   Wearing glasses   Behavior:  pleasant, cooperative, fair eye contact Restlessness   Speech:   ,WNL, rhythm, Loud volume, some  latency   Mood:  ,anxious   Affect:  mood-congruent    Thought Process:  disorganized and logical    Thought Content:  no overt delusions, somatic preoccupation   Perceptual Disturbances: no auditory hallucinations, no visual hallucinations, Does not appear responding or preoccupied   Delusions  No   Risk Potential: Suicidal Ideations No  Homicidal Ideations No  Potential for Aggression No     Sensorium:  Oriented to person, place, time/date and situation   Memory:  recent and remote memory grossly intact   Consciousness:  alert and awake   Attention: attention span and concentration are age appropriate   Insight:  fair   Judgment: fair   Gait/Station: normal   Motor Activity: no abnormal movements     There were no vitals filed for this visit  Medications:   Current Outpatient Medications on File Prior to Visit   Medication Sig Dispense Refill   • atorvastatin (LIPITOR) 80 mg tablet Take 80 mg by mouth daily at bedtime     • betamethasone valerate (VALISONE) 0 1 % ointment Apply 1 application topically 2 (two) times a day To affected areas     • lamoTRIgine (LaMICtal) 25 mg tablet Lamotrigine 25m tablet po per day x 2wks  Then 2 tablet po per day x 2 wks  60 tablet 1   • levothyroxine 25 mcg tablet Take 25 mcg by mouth daily     • metoprolol succinate (TOPROL-XL) 25 mg 24 hr tablet Take 25 mg by mouth 2 (two) times a day     • risperiDONE (RisperDAL) 1 mg tablet Risperidone 1 m tablet po night x  3 days  Then 2 tablet po at night 30 tablet 1   • rOPINIRole (REQUIP) 0 25 mg tablet Take 0 25 mg by mouth daily at bedtime     • traZODone (DESYREL) 50 mg tablet Trazodone 50m-2 tablet po at night 180 tablet 0     No current facility-administered medications on file prior to visit  Labs: I have personally reviewed all pertinent laboratory/tests results     Most Recent Labs:   Lab Results   Component Value Date    WBC 4 9 2022    RBC 4 18 2022    HGB 13 0 2022    HCT 38 2 2022     2022    RDW 13 3 2022    NEUTROABS 2 6 2022    SODIUM 143 2022    K 4 1 2022     2022    CO2 21 2022    BUN 18 2022 CREATININE 0 91 12/27/2022    GLUC 105 (H) 12/27/2022    CALCIUM 9 1 09/12/2019    AST 53 (H) 12/27/2022    ALT 34 (H) 12/27/2022    ALKPHOS 91 09/12/2019    TP 6 5 12/27/2022    ALB 3 9 12/27/2022    TBILI 0 4 12/27/2022    CHOLESTEROL 116 12/27/2022    HDL 42 12/27/2022    TRIG 91 12/27/2022    LDLCALC 56 12/27/2022    HGBA1C 6 4 (H) 12/27/2022     12/27/2022     ________________________________________________________________________    A/P  Ann, 59 yr old female with h/o comorbid medical conditions, presented for depression and anxiety- generalized,  hopelessness linked to familial strife  Interim events,  precipitated shaq via lexapro (  Irritability- to elated, limited sleep,  Similar presentation on prior MSE  Interim events stopped Lamotrigine and Risperidone due to s/e  Today, reports of sadness linked to death of dad  Case complicated , appears severely restless, high energy , labile affect  DSM 5:  Bipolar Disorder,Otherwise   Major depressive disorder, recurrent severe without psychotic features [facility])      Medical Problem:  Hypothyroid  High Cholesterol  S/p MI and bypass  Rule out RLS, pending sleep study      Plan:  Discussed diagnostic impression based on history, external records and clinical findings  WNL 12/27/22-  labs CBC diff, CMP, TSH, Lipids, HgA1C  There's prior medications trails  Ann was instructed to write down list of prior to next encounter - plans than to discuss treatment  Discontinued Risperidone and Lamotrigine    No orders of the defined types were placed in this encounter  Treatement: Risks, benefits, and possible side effects of medications explained to patient and patient verbalizes understanding  Today's Appointment@ [facility]  Face To Face:  11:53 AM -12:21 PM;Documentation Time:  12:50 PM- 1:01 PM    Encounter Duration: Time Spent 29 minutes with Patient  Greater than 50% of total time was spent with the patient     MDM  Number of Diagnoses or Management Options  Major depressive disorder, recurrent severe without psychotic features ([facility] [address] ): established, worsening  Otherwise Bipolar Disorder : established, worsening     Amount and/or Complexity of Data Reviewed  Clinical lab tests: reviewed  Review and summarize past medical records: yes    Risk of Complications, Morbidity, and/or Mortality  Presenting problems: moderate  Management options: low

## 2023-01-09 ENCOUNTER — SOCIAL WORK (OUTPATIENT)
Dept: BEHAVIORAL/MENTAL HEALTH CLINIC | Facility: CLINIC | Age: 58
End: 2023-01-09

## 2023-01-09 DIAGNOSIS — F33.9 EPISODE OF RECURRENT MAJOR DEPRESSIVE DISORDER, UNSPECIFIED DEPRESSION EPISODE SEVERITY (HCC): Primary | ICD-10-CM

## 2023-01-10 ENCOUNTER — OFFICE VISIT (OUTPATIENT)
Dept: PSYCHIATRY | Facility: CLINIC | Age: 58
End: 2023-01-10

## 2023-01-10 DIAGNOSIS — F33.2 MAJOR DEPRESSIVE DISORDER, RECURRENT SEVERE WITHOUT PSYCHOTIC FEATURES (HCC): ICD-10-CM

## 2023-01-10 DIAGNOSIS — F31.30 BIPOLAR AFFECTIVE DISORDER, CURRENT EPISODE DEPRESSED, CURRENT EPISODE SEVERITY UNSPECIFIED (HCC): Primary | ICD-10-CM

## 2023-01-10 NOTE — PSYCH
Psychotherapy Provided: Individual Psychotherapy 30 minutes     Length of time in session: 34 minutes, follow up in 2 week    Encounter Diagnosis     ICD-10-CM    1  [facility], [facility]  F33 9           Goals addressed in session: Goal 1     Pain:      none    0    Current suicide risk : Low     D: Therapist and client began processing the recent passing of client's father  Client asked what she could do, and therapist continued to stress the importance of self-care  When client did not appear to like therapist's suggestions, therapist pointed this out to client  Therapist reminded client that we've discussed these steps during therapy before, and client has not acted on them, and that the specific self-care activities are just examples, but that self-care as a whole is still very important  A: Client appeared to be oriented x3  Client appeared to be depressed about the loss of her father as evidence by client crying throughout session  However, client appeared to not be open to therapist's suggestions of steps to take to work towards progress  P: Therapist and client will meet again in 2 weeks, and will continue to discuss steps client can take towards progress  Behavioral Health Treatment Plan [facility]: Diagnosis and Treatment Plan explained to Dharmesh Baker relates understanding diagnosis and is agreeable to Treatment Plan   Yes     Visit start and stop times:    01/09/23  Start Time: 1311  Stop Time: 1345  Total Visit Time: 34 minutes

## 2023-02-06 ENCOUNTER — SOCIAL WORK (OUTPATIENT)
Dept: BEHAVIORAL/MENTAL HEALTH CLINIC | Facility: CLINIC | Age: 58
End: 2023-02-06

## 2023-02-06 DIAGNOSIS — F41.1 GENERALIZED ANXIETY DISORDER: ICD-10-CM

## 2023-02-06 DIAGNOSIS — F33.9 EPISODE OF RECURRENT MAJOR DEPRESSIVE DISORDER, UNSPECIFIED DEPRESSION EPISODE SEVERITY (HCC): Primary | ICD-10-CM

## 2023-02-06 NOTE — BH TREATMENT PLAN
Outpatient Behavioral Health Psychotherapy Treatment Plan    Nataliya Joya  1965     Date of Initial Psychotherapy Assessment: Unknown, prior to [facility]   Date of Current Treatment Plan: 02/06/23  Treatment Plan Target Date: 08/05/23  Treatment Plan Expiration Date: 08/05/23    Diagnosis:   1  Episode of recurrent major depressive disorder, unspecified depression episode severity ([facility] [address] )        2  Generalized anxiety disorder            Area(s) of Need: Depression, anxiety, trauma    Long Term Goal 1 (in the client's own words): Lessen the impact that depression has on client's life     Stage of Change: Contemplation    Target Date for completion: 08/05/23     Anticipated therapeutic modalities: Supportive therapy, coping skill development     People identified to complete this goal: Client, therapist      Objective 1: (identify the means of measuring success in meeting the objective): Develop a list of self-care skills      Objective 2: (identify the means of measuring success in meeting the objective): Develop a list of healthy coping skills      Long Term Goal 2 (in the client's own words): Improve client's relationship with family members     Stage of Change: Contemplation    Target Date for completion: 08/05/23     Anticipated therapeutic modalities: Supportive therapy, CBT     People identified to complete this goal: Client, therapist      Objective 1: (identify the means of measuring success in meeting the objective): Improve upon communication and social skills       Objective 2: (identify the means of measuring success in meeting the objective):       Long Term Goal 3 (in the client's own words): Lessen the impact that anxiety has on client's life     Stage of Change: Contemplation    Target Date for completion: 08/05/23     Anticipated therapeutic modalities: Supportive therapy, CBT, Coping skill development     People identified to complete this goal: Client, therapist      Objective 1: (identify the means of measuring success in meeting the objective): Develop a list of healthy coping skills       Objective 2: (identify the means of measuring success in meeting the objective): Learn to challenge anxious thoughts     I am currently under the care of a [facility] psychiatric provider: yes    My [facility] psychiatric provider is: Dr Rima Pruett    I am currently taking psychiatric medications: Yes, as prescribed    I feel that I will be ready for discharge from mental health care when I reach the following (measurable goal/objective): Goals    For children and adults who have a legal guardian:   Has there been any change to custody orders and/or guardianship status? NA  If yes, attach updated documentation  I have created my Crisis Plan and have been offered a copy of this plan    [address]: Diagnosis and Treatment Plan explained to [address] acknowledges an understanding of their diagnosis  Joseph Faria agrees to this treatment plan      I have been offered a copy of this Treatment Plan  yes

## 2023-02-06 NOTE — PSYCH
Behavioral Health Psychotherapy Progress Note    Psychotherapy Provided: Individual Psychotherapy     1  Episode of recurrent major depressive disorder, unspecified depression episode severity ([facility] [address] )        2  Generalized anxiety disorder            Goals addressed in session: Goal 1     DATA: Therapist and client updated client's treatment plan  Therapist and client discussed client's coping skills that she can use when she is feeling depressed and anxious  Therapist and client discussed steps client can take to work towards her goal of finding a new job  Therapist and client discussed feedback client has been given at work, and how this likely impacted her shifts being cut down, and how that feedback can inform her choosing of job types moving forward  During this session, this clinician used the following therapeutic modalities: Engagement Strategies, Cognitive Behavioral Therapy, Mindfulness-based Strategies, Solution-Focused Therapy and Supportive Psychotherapy    Substance Abuse was not addressed during this session  If the client is diagnosed with a co-occurring substance use disorder, please indicate any changes in the frequency or amount of use: NA  Stage of change for addressing substance use diagnoses: No substance use/Not applicable    ASSESSMENT:  Salena Banks presents with a Euthymic/ normal mood  her affect is Normal range and intensity, which is congruent, with her mood and the content of the session  The client has made progress on their goals  Salena Banks presents with a none risk of suicide, none risk of self-harm, and none risk of harm to others  For any risk assessment that surpasses a "low" rating, a safety plan must be developed  A safety plan was indicated: no  If yes, describe in detail NA    PLAN: Between sessions, Salena Banks will practice her coping skills   At the next session, the therapist will use Engagement Strategies, Cognitive Behavioral Therapy, Mindfulness-based Strategies, Solution-Focused Therapy and Supportive Psychotherapy to address emotion regulation and coping skills  Behavioral Health Treatment Plan and Discharge Planning: Cristopher Rosario is aware of and agrees to continue to work on their treatment plan  They have identified and are working toward their discharge goals   yes    Visit start and stop times:    02/06/23  Start Time: 1304  Stop Time: 1345  Total Visit Time: 41 minutes
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
## 2023-02-06 NOTE — PATIENT INSTRUCTIONS
[facility] [id number] LUCERO sewell for presenting to today's Appointment at [place] [address]   Instruction are as listed:  1) Risperidone was started   2) Lexapro was prescribed as Elvin Adas was started on that via another proivder in  Jan 2023

## 2023-02-07 ENCOUNTER — OFFICE VISIT (OUTPATIENT)
Dept: PSYCHIATRY | Facility: CLINIC | Age: 58
End: 2023-02-07

## 2023-02-07 DIAGNOSIS — F31.13 BIPOLAR I DISORDER, CURRENT OR MOST RECENT EPISODE MANIC, SEVERE (HCC): Primary | ICD-10-CM

## 2023-02-07 DIAGNOSIS — F41.1 GENERALIZED ANXIETY DISORDER: ICD-10-CM

## 2023-02-07 RX ORDER — ESCITALOPRAM OXALATE 10 MG/1
TABLET ORAL
Qty: 30 TABLET | Refills: 1 | Status: SHIPPED | OUTPATIENT
Start: 2023-02-07

## 2023-02-07 RX ORDER — RISPERIDONE 1 MG/1
TABLET ORAL
Qty: 60 TABLET | Refills: 1 | Status: SHIPPED | OUTPATIENT
Start: 2023-02-07

## 2023-02-20 ENCOUNTER — SOCIAL WORK (OUTPATIENT)
Dept: BEHAVIORAL/MENTAL HEALTH CLINIC | Facility: CLINIC | Age: 58
End: 2023-02-20

## 2023-02-20 DIAGNOSIS — Z63.8 STRESS DUE TO FAMILY TENSION: ICD-10-CM

## 2023-02-20 DIAGNOSIS — F41.1 GENERALIZED ANXIETY DISORDER: ICD-10-CM

## 2023-02-20 DIAGNOSIS — F33.9 EPISODE OF RECURRENT MAJOR DEPRESSIVE DISORDER, UNSPECIFIED DEPRESSION EPISODE SEVERITY (HCC): Primary | ICD-10-CM

## 2023-02-20 SDOH — SOCIAL STABILITY - SOCIAL INSECURITY: OTHER SPECIFIED PROBLEMS RELATED TO PRIMARY SUPPORT GROUP: Z63.8

## 2023-03-06 ENCOUNTER — SOCIAL WORK (OUTPATIENT)
Dept: BEHAVIORAL/MENTAL HEALTH CLINIC | Facility: CLINIC | Age: 58
End: 2023-03-06

## 2023-03-06 DIAGNOSIS — F41.1 GENERALIZED ANXIETY DISORDER: ICD-10-CM

## 2023-03-06 DIAGNOSIS — F33.9 EPISODE OF RECURRENT MAJOR DEPRESSIVE DISORDER, UNSPECIFIED DEPRESSION EPISODE SEVERITY (HCC): Primary | ICD-10-CM
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
## 2023-03-20 ENCOUNTER — SOCIAL WORK (OUTPATIENT)
Dept: BEHAVIORAL/MENTAL HEALTH CLINIC | Facility: CLINIC | Age: 58
End: 2023-03-20

## 2023-03-20 DIAGNOSIS — F33.9 EPISODE OF RECURRENT MAJOR DEPRESSIVE DISORDER, UNSPECIFIED DEPRESSION EPISODE SEVERITY (HCC): Primary | ICD-10-CM

## 2023-04-03 ENCOUNTER — SOCIAL WORK (OUTPATIENT)
Dept: BEHAVIORAL/MENTAL HEALTH CLINIC | Facility: CLINIC | Age: 58
End: 2023-04-03

## 2023-04-03 DIAGNOSIS — F33.9 EPISODE OF RECURRENT MAJOR DEPRESSIVE DISORDER, UNSPECIFIED DEPRESSION EPISODE SEVERITY (HCC): Primary | ICD-10-CM

## 2023-04-03 DIAGNOSIS — F41.1 GENERALIZED ANXIETY DISORDER: ICD-10-CM

## 2023-04-03 NOTE — PSYCH
"Behavioral Health Psychotherapy Progress Note    Psychotherapy Provided: Individual Psychotherapy     1  Episode of recurrent major depressive disorder, unspecified depression episode severity ([facility] [address] )            Goals addressed in session: Goal 1     DATA: Therapist and client continued to discuss client's feeling that she needs a different job, and to make more money  Client was upset that she was not progressing far in the interview process  Therapist again encouraged client to engage in mock interviews in order to find out why she isn't progressing in the interview process  Client stated that she does not want to have to act or dress differently in an interview because she is who she is, which therapist challenged the reality of client progressing in the interview process if she doesn't act and dress in the expected manner  During this session, this clinician used the following therapeutic modalities: Solution-Focused Therapy and Supportive Psychotherapy    Substance Abuse was not addressed during this session  If the client is diagnosed with a co-occurring substance use disorder, please indicate any changes in the frequency or amount of use: NA  Stage of change for addressing substance use diagnoses: No substance use/Not applicable    ASSESSMENT:  Elliot Rodriguez presents with a Euthymic/ normal mood  her affect is Normal range and intensity, which is congruent, with her mood and the content of the session  The client has made progress on their goals  Elliot Rodriguez presents with a none risk of suicide, none risk of self-harm, and none risk of harm to others  For any risk assessment that surpasses a \"low\" rating, a safety plan must be developed  A safety plan was indicated: no  If yes, describe in detail NA    PLAN: Between sessions, Elliot Rodriguez will explore mock interviews   At the next session, the therapist will use Solution-Focused Therapy and Supportive Psychotherapy to address taking steps " towards goals to improve upon her depression  Behavioral Health Treatment Plan and Discharge Planning: Walker Schmitz is aware of and agrees to continue to work on their treatment plan  They have identified and are working toward their discharge goals   yes    Visit start and stop times:    03/20/23  Start Time: 1320  Stop Time: 1345  Total Visit Time: 25 minutes

## 2023-04-24 NOTE — PSYCH
"Behavioral Health Psychotherapy Progress Note    Psychotherapy Provided: Individual Psychotherapy     1  Episode of recurrent major depressive disorder, unspecified depression episode severity ([facility] [address] )        2  Generalized anxiety disorder            Goals addressed in session: Goal 1 and Goal 2     DATA: Therapist and client discussed ways that client can engage in self-care  Client shared that she had gone on another interview, and that it had lasted less than a few minutes  Therapist continued to encourage client to pursue engaging in mock interviews  Therapist and client continued to process client's relationship with her son  During this session, this clinician used the following therapeutic modalities: Family Therapy, Solution-Focused Therapy and Supportive Psychotherapy    Substance Abuse was not addressed during this session  If the client is diagnosed with a co-occurring substance use disorder, please indicate any changes in the frequency or amount of use: NA  Stage of change for addressing substance use diagnoses: No substance use/Not applicable    ASSESSMENT:  Khoa Mejia presents with a Euthymic/ normal mood  her affect is Normal range and intensity, which is congruent, with her mood and the content of the session  The client has made progress on their goals  Khoa Mejia presents with a none risk of suicide, none risk of self-harm, and none risk of harm to others  For any risk assessment that surpasses a \"low\" rating, a safety plan must be developed  A safety plan was indicated: no  If yes, describe in detail NA    PLAN: Between sessions, Khoa Mejia will seek out mock interviews  At the next session, the therapist will use Mindfulness-based Strategies to address self-care skills  Behavioral Health Treatment Plan and Discharge Planning: Khoa Mejia is aware of and agrees to continue to work on their treatment plan  They have identified and are working toward their discharge goals   " yes    Visit start and stop times:    04/03/23  Start Time: 1300  Stop Time: 1345  Total Visit Time: 45 minutes

## 2023-10-03 ENCOUNTER — OFFICE VISIT (OUTPATIENT)
Dept: OBGYN CLINIC | Facility: CLINIC | Age: 58
End: 2023-10-03
Payer: COMMERCIAL

## 2023-10-03 VITALS
HEIGHT: 59 IN | SYSTOLIC BLOOD PRESSURE: 120 MMHG | WEIGHT: 161 LBS | BODY MASS INDEX: 32.46 KG/M2 | DIASTOLIC BLOOD PRESSURE: 70 MMHG

## 2023-10-03 DIAGNOSIS — L40.9 PSORIASIS: ICD-10-CM

## 2023-10-03 DIAGNOSIS — Z01.411 ENCOUNTER FOR GYNECOLOGICAL EXAMINATION WITH ABNORMAL FINDING: Primary | ICD-10-CM

## 2023-10-03 DIAGNOSIS — Z12.4 SCREENING FOR CERVICAL CANCER: ICD-10-CM

## 2023-10-03 DIAGNOSIS — Z12.31 ENCOUNTER FOR SCREENING MAMMOGRAM FOR MALIGNANT NEOPLASM OF BREAST: ICD-10-CM

## 2023-10-03 DIAGNOSIS — Q96.9 TURNER'S SYNDROME: ICD-10-CM

## 2023-10-03 DIAGNOSIS — F41.1 GENERALIZED ANXIETY DISORDER: ICD-10-CM

## 2023-10-03 DIAGNOSIS — E28.39 ESTROGEN DEFICIENCY: ICD-10-CM

## 2023-10-03 DIAGNOSIS — Z78.0 POST-MENOPAUSAL: ICD-10-CM

## 2023-10-03 PROBLEM — Z01.419 ENCOUNTER FOR GYNECOLOGICAL EXAMINATION WITHOUT ABNORMAL FINDING: Status: ACTIVE | Noted: 2023-10-03

## 2023-10-03 PROCEDURE — 99386 PREV VISIT NEW AGE 40-64: CPT | Performed by: OBSTETRICS & GYNECOLOGY

## 2023-10-03 RX ORDER — CLOBETASOL PROPIONATE 0.5 MG/G
1 CREAM TOPICAL 2 TIMES DAILY
COMMUNITY
Start: 2023-06-26

## 2023-10-03 RX ORDER — LORAZEPAM 0.5 MG/1
0.5 TABLET ORAL
COMMUNITY
Start: 2023-09-12

## 2023-10-03 NOTE — PROGRESS NOTES
[address] [place] [address], [address], [facility], [address] [facility],    ASSESSMENT/PLAN: Arianne Teague is a 59 y.o.    Hx of Lange's syndrome   who presents for annual gynecologic exam.    Encounter for routine gynecologic examination  - Routine well woman exam completed today. - Cervical Cancer Screening: Current ASCCP Guidelines reviewed. Last Pap: Not on file . Next Pap Due: today  - COVID vaccine   Mad[place]  , flu shot neg     - Contraceptive counseling discussed. Current contraception: condoms or combination OCPs:   - Breast Cancer Screening: Last Mammogram Not on file, due now   - Colorectal cancer screening was not ordered. - The following were reviewed in today's visit: breast self exam, mammography screening ordered, menopause, osteoporosis, adequate intake of calcium and vitamin D, exercise, healthy diet and colonoscopy discussed and ordered    Additional problems addressed during this visit:  1. Encounter for gynecological examination with abnormal finding    2. Generalized anxiety disorder    3. Screening for cervical cancer    4. Encounter for screening mammogram for malignant neoplasm of breast  -     Mammo screening bilateral w 3d & cad; Future    5. Psoriasis  -     Ambulatory Referral to Dermatology; Future; Expected date: 10/10/2023    6. Lange's syndrome    7. Post-menopausal    8. Estrogen deficiency        CC:  Annual Gynecologic Examination    HPI: Arianne Teague is a 59 y.o. No obstetric history on file. who presents for annual gynecologic examination. HPI    The following portions of the patient's history were reviewed and updated as appropriate: She  has a past medical history of Depression, Disease of thyroid gland, History of MI (myocardial infarction) (2021), Hyperlipidemia, Hypertension, Psoriasis, and Lange's syndrome. She  has a past surgical history that includes Tonsillectomy and Cardiac surgery ().   Her family history includes Alzheimer's disease in her father; Breast cancer in her maternal grandmother, mother, and sister; Cancer in her maternal grandmother and paternal grandfather; Diabetes in her paternal grandfather. She  reports that she has never smoked. She has never been exposed to tobacco smoke. She has never used smokeless tobacco. She reports that she does not drink alcohol and does not use drugs. Current Outpatient Medications   Medication Sig Dispense Refill   • atorvastatin (LIPITOR) 80 mg tablet Take 80 mg by mouth daily at bedtime     • betamethasone valerate (VALISONE) 0.1 % ointment Apply 1 application topically 2 (two) times a day To affected areas     • clobetasol (TEMOVATE) 1.05 % cream 1 Application 2 (two) times a day To affected areas     • escitalopram (Lexapro) 10 mg tablet Escitalopram 10m tablet po daily 30 tablet 1   • levothyroxine 25 mcg tablet Take 25 mcg by mouth daily     • LORazepam (ATIVAN) 0.5 mg tablet Take 0.5 mg by mouth daily at bedtime     • metoprolol succinate (TOPROL-XL) 25 mg 24 hr tablet Take 25 mg by mouth 2 (two) times a day     • traZODone (DESYREL) 50 mg tablet Trazodone 50m-2 tablet po at night 180 tablet 0   • risperiDONE (RisperDAL) 1 mg tablet Risperidone 1 mg : 1 tablet in the morning x 1 wk. Then 1 tablet twice daily ( morning and night) 60 tablet 1   • rOPINIRole (REQUIP) 0.25 mg tablet Take 0.25 mg by mouth daily at bedtime       No current facility-administered medications for this visit. She is allergic to bupropion, carbidopa w-levodopa, and lisinopril. .    Review of Systems:  All systems normal except as noted in HPI          Objective:  /70 (BP Location: Left arm, Patient Position: Sitting, Cuff Size: Standard)   Ht 4' 10.5" (1.486 m)   Wt 73 kg (161 lb)   BMI 33.08 kg/m²    Physical Exam  Vitals and nursing note reviewed. Constitutional:       General: She is not in acute distress. Appearance: Normal appearance. HENT:      Head: Normocephalic and atraumatic. Cardiovascular:      Rate and Rhythm: Normal rate and regular rhythm. Pulses: Normal pulses. Heart sounds: Murmur heard. Pulmonary:      Effort: Pulmonary effort is normal.      Breath sounds: Normal breath sounds. Chest:      Chest wall: No mass, lacerations, swelling, tenderness or edema. Breasts: Desean Score is 4. Breasts are symmetrical.      Right: Normal. No swelling, bleeding, inverted nipple, mass, nipple discharge, skin change or tenderness. Left: No swelling, bleeding, inverted nipple, mass, nipple discharge, skin change or tenderness. Abdominal:      General: Abdomen is flat. Bowel sounds are normal.      Palpations: Abdomen is soft. Tenderness: There is no right CVA tenderness or left CVA tenderness. Genitourinary:     General: Normal vulva. Exam position: Lithotomy position. Pubic Area: No rash. Desean stage (genital): 4.      Labia:         Right: No rash, tenderness or lesion. Left: No rash, tenderness or lesion. Urethra: No urethral pain, urethral swelling or urethral lesion. Vagina: Normal.      Cervix: Normal.      Adnexa: Right adnexa normal and left adnexa normal.      Rectum: Normal.          Comments: Difficult to assess due to habitus   Musculoskeletal:         General: Normal range of motion. Cervical back: Normal range of motion and neck supple. No rigidity or tenderness. Lymphadenopathy:      Upper Body:      Right upper body: No supraclavicular, axillary or pectoral adenopathy. Left upper body: No supraclavicular, axillary or pectoral adenopathy. Lower Body: No right inguinal adenopathy. No left inguinal adenopathy. Skin:     General: Skin is warm and dry. Coloration: Skin is not jaundiced or pale. Findings: Erythema and rash present. Rash is crusting and purpuric. Neurological:      Mental Status: She is alert.    Psychiatric:         Mood and Affect: Mood normal.

## 2023-10-07 LAB
CYTOLOGIST CVX/VAG CYTO: NORMAL
DX ICD CODE: NORMAL
HPV GENOTYPE REFLEX: NORMAL
HPV I/H RISK 4 DNA CVX QL PROBE+SIG AMP: NEGATIVE
OTHER STN SPEC: NORMAL
PATH REPORT.FINAL DX SPEC: NORMAL
SL AMB NOTE:: NORMAL
SL AMB SPECIMEN ADEQUACY: NORMAL
SL AMB TEST METHODOLOGY: NORMAL

## 2023-10-11 ENCOUNTER — TELEPHONE (OUTPATIENT)
Dept: OBGYN CLINIC | Facility: CLINIC | Age: 58
End: 2023-10-11

## 2023-10-11 NOTE — TELEPHONE ENCOUNTER
Pt called the office informing she referred to Dermatology and has not heard from [facility] to schedule. Pt did mention she received a call from [facility] service number however was unsure who was calling. Advised patient customer service may have been calling to help facilitate her Dermatology appointment. Provided number for  central scheduling department  to also help assist with scheduling an appointment.

## 2023-10-26 DIAGNOSIS — Z12.31 ENCOUNTER FOR SCREENING MAMMOGRAM FOR MALIGNANT NEOPLASM OF BREAST: ICD-10-CM

## 2023-11-07 ENCOUNTER — TELEPHONE (OUTPATIENT)
Dept: OBGYN CLINIC | Facility: CLINIC | Age: 58
End: 2023-11-07

## 2023-11-07 NOTE — TELEPHONE ENCOUNTER
Ann l/m requesting the name of the dermatologist Cheryl Rowley referred her too.  Return call and provide name of Dr. Daniella Isaac

## 2023-11-15 ENCOUNTER — CONSULT (OUTPATIENT)
Dept: DERMATOLOGY | Facility: CLINIC | Age: 58
End: 2023-11-15
Payer: COMMERCIAL

## 2023-11-15 DIAGNOSIS — L40.9 PSORIASIS: ICD-10-CM

## 2023-11-15 DIAGNOSIS — Z79.899 HIGH RISK MEDICATION USE: Primary | ICD-10-CM

## 2023-11-15 PROCEDURE — 99244 OFF/OP CNSLTJ NEW/EST MOD 40: CPT | Performed by: DERMATOLOGY

## 2023-11-15 RX ORDER — TRIAMCINOLONE ACETONIDE 1 MG/G
OINTMENT TOPICAL
Qty: 453 G | Refills: 1 | Status: SHIPPED | OUTPATIENT
Start: 2023-11-15

## 2023-11-15 RX ORDER — TRIAMCINOLONE ACETONIDE 1 MG/ML
LOTION TOPICAL
Qty: 60 ML | Refills: 2 | Status: SHIPPED | OUTPATIENT
Start: 2023-11-15

## 2023-11-15 RX ORDER — CALCIPOTRIENE 50 UG/G
OINTMENT TOPICAL
Qty: 120 G | Refills: 2 | Status: SHIPPED | OUTPATIENT
Start: 2023-11-15

## 2023-11-15 NOTE — PATIENT INSTRUCTIONS
Assessment and Plan:  Based on a thorough discussion of this condition and the management approach to it (including a comprehensive discussion of the known risks, side effects and potential benefits of treatment), the patient (family) agrees to implement the following specific plan:  Start triamcinolone 0.1% ointment apply once daily to plaque on skin at night   Start triamcinolone 0.1% lotion apply daily to scalp  Start calcipotriene 0.005% apply daily to plaques on skin in the morning  Lab order chronic Hepatitis panel and Quantiferon gold   No response to topical   Unable to comply with phototherapy due distant from office   Consider restart skyrizi injection       Psoriasis is a chronic inflammatory condition that causes the body to make new skin cells in days rather than weeks. As these cells pile up on the surface of the skin, you may see thick, scaly patches of thickened skin. Psoriasis affects 2-4% of males and females. It can start at any age including childhood, with peaks of onset at 15-25 years and 50-60 years. It tends to persist lifelong, fluctuating in extent and severity. It is particularly common in Caucasians but may affect people of any race. About one-third of patients with psoriasis have family members with psoriasis. Psoriasis is multifactorial. It is classified as an immune-mediated inflammatory disease (IMID). Genetic factors are important and influence the type of psoriasis and response to treatment. What are the signs and symptoms of psoriasis? There are many different types of psoriasis that each have present uniquely. The types of psoriasis include:    Plaque psoriasis: About 80% to 90% of people who have psoriasis develop this type. When plaque psoriasis appears, you may see:  Plaque psoriasis usually presents with symmetrically distributed, red, scaly plaques with well-defined edges.  The scale is typically silvery white, except in skin folds where the plaques often appear shiny and they may have a moist peeling surface. The most common sites are scalp, elbows and knees, but any part of the skin can be involved. The plaques are usually very persistent without treatment. Itch is mostly mild but may be severe in some patients, leading to scratching and lichenification (thickened leathery skin with increased skin markings). Painful skin cracks or fissures may occur. When psoriatic plaques clear up, they may leave brown or pale marks that can be expected to fade over several months. Guttate psoriasis: When someone gets this type of psoriasis, you often see tiny bumps appear on the skin quite suddenly. The bumps tend to cover much of the torso, legs, and arms. Sometimes, the bumps also develop on the face, scalp, and ears. No matter where they appear, the bumps tend to be:   Small and scaly  [place]-colored to pink  Temporary, clearing in a few weeks or months without treatment  When guttate psoriasis clears, it may never return. Why this happens is still a bit of a mystery. Guttate psoriasis tends to develop in children and young adults who've had an infection, such as strep throat. It's possible that when the infection clears so does guttate psoriasis. It's also possible to have:  Guttate psoriasis for life  See the guttate psoriasis clear and plaque psoriasis develop later in life  Plaque psoriasis when you develop guttate psoriasis  There's no way to predict what will happen after the first flare-up of guttate psoriasis clears. Inverse psoriasis: This type of psoriasis develops in areas where skin touches skin, such as the armpits, genitals, and crease of the buttocks. Where the inverse psoriasis appears, you're likely to notice:  Smooth, red patches of skin that look raw  Little, if any, silvery-white coating  Sore or painful skin  Other names for this type of psoriasis are intertriginous psoriasis or flexural psoriasis. Pustular psoriasis:  This type of psoriasis causes pus-filled bumps that usually appear only on the feet and hands. While the pus-filled bumps may look like an infection, the skin is not infected. The bumps don't contain bacteria or anything else that could cause an infection. Where pustular psoriasis appears, you tend to notice:  Red, swollen skin that is dotted with pus-filled bumps  Extremely sore or painful skin  Brown dots (and sometimes scale) appear as the pus-filled bumps dry  Pustular psoriasis can make just about any activity that requires your hands or feet, such as typing or walking, unbearably painful. Pustular psoriasis (generalized): Serious and life-threatening, this rare type of psoriasis causes pus-filled bumps to develop on much of the skin. Also called von Zumbusch psoriasis, a flare-up causes this sequence of events:  Skin on most of the body suddenly turns dry, red, and tender. Within hours, pus-filled bumps cover most of the skin. Often within a day, the pus-filled bumps break open and pools of pus leak onto the skin. As the pus dries (usually within 24 to 48 hours), the skin dries out and peels (as shown in this picture). When the dried skin peels off, you see a smooth, glazed surface. In a few days or weeks, you may see a new crop of pus-filled bumps covering most of the skin, as the cycle repeats itself. Anyone with pustular psoriasis also feels very sick, and may develop a fever, headache, muscle weakness, and other symptoms. Medical care is often necessary to save the person's life. Erythrodermic psoriasis: Serious and life-threatening, this type of psoriasis requires immediate medical care. When someone develops erythrodermic psoriasis, you may notice:  Skin on most of the body looks burnt  Chills, fever, and the person looks extremely ill  Muscle weakness, a rapid pulse, and severe itch  The person may also be unable to keep warm, so hypothermia can set in quickly.   Most people who develop this type of psoriasis already have another type of psoriasis. Before developing erythrodermic psoriasis, they often notice that their psoriasis is worsening or not improving with treatment. If you notice either of these happening, see a board-certified dermatologist.    Nails    Nail psoriasis: With any type of psoriasis, you may see changes to your fingernails or toenails. About half of the people who have plaque psoriasis see signs of psoriasis on their fingernails at some point2. When psoriasis affects the nails, you may notice:  Tiny dents in your nails (called “nail pits”)  White, yellow, or brown discoloration under one or more nails  Crumbling, rough nails  A nail lifting up so that it's no longer attached  Buildup of skin cells beneath one or more nails, which lifts up the nail  Treatment and proper nail care can help you control nail psoriasis. Psoriatic arthritis: If you have psoriasis, it's important to pay attention to your joints. Some people who have psoriasis develop a type of arthritis called psoriatic arthritis. This is more likely to occur if you have severe psoriasis. Most people notice psoriasis on their skin years before they develop psoriatic arthritis. It's also possible to get psoriatic arthritis before psoriasis, but this is less common. When psoriatic arthritis develops, the signs can be subtle. At first, you may notice:  A swollen and tender joint, especially in a finger or toe  Heel pain  Swelling on the back of your leg, just above your heel  Stiffness in the morning that fades during the day  Like psoriasis, psoriatic arthritis cannot be cured. Treatment can prevent psoriatic arthritis from worsening, which is important. Allowed to progress, psoriatic arthritis can become disabling. Diagnosis and treatment of psoriasis   Psoriasis is usually diagnosed by clinical features, and skin biopsy if necessary. It is important to decrease factors that aggravate psoriasis.  These include treating streptococcal infections, minimizing skin injuries, avoiding sun exposure if it exacerbates psoriasis, smoking, alcohol usage, decreasing stress, and maintaining an optimal body weight. Certain medications such as lithium, beta blockers, antimalarials, and NSAIDs have also been implicated. Suddenly stopping oral steroids or strong topical steroids can cause rebound disease. There are many categories of psoriasis treatments available. Topical therapy  Mild psoriasis is generally treated with topical agents alone. Which treatment is selected may depend on body site, extent and severity of psoriasis. Emollients  Coal tar preparations  Dithranol  Salicylic acid  Vitamin D analogue (calcipotriol)  Topical corticosteroids  Calcineurin inhibitor (tacrolimus, pimecrolimus)  Phototherapy  Most psoriasis centres offer phototherapy with ultraviolet (UV) radiation, often in combination with topical or systemic agents. Types of phototherapy include  Narrowband UVB  Broadband UVB  Photochemotherapy (PUVA)  Targeted phototherapy  Systemic therapy  Moderate to severe psoriasis warrants treatment with a systemic agent and/or phototherapy. The most common treatments are:  Methotrexate  Ciclosporin  Acitretin  Other medicines occasionally used for psoriasis include:  Mycophenolate  Apremilast  Hydroxyurea  Azathioprine  6-mercaptopurine  Systemic corticosteroids are best avoided due to a risk of severe withdrawal flare of psoriasis and adverse effects. Biologics or targeted therapies are reserved for conventional treatment-resistant severe psoriasis, mainly because of expense, as side effects compare favorably with other systemic agents. These include:  Anti-tumour necrosis factor-alpha antagonists (anti-TNF?) infliximab, adalimumab and etanercept  The interleukin (IL)-12/23 antagonist ustekinumab  IL-17 antagonists such as secukinumab  Many other monoclonal antibodies are under investigation in the treatment of psoriasis.

## 2023-11-15 NOTE — PROGRESS NOTES
[facility] Note     Patient Name: Dasha Larson  Encounter Date: 11/15/23     Have you been cared for by a [facility] in the last 3 years and, if so, which description applies to you? NO. I am considered a "new" patient and must complete all patient intake questions. I am FEMALE/of child-bearing potential.    REVIEW OF SYSTEMS:  Have you recently had or currently have any of the following? Recent fever or chills? No  Any non-healing wound? No  Are you pregnant or planning to become pregnant? No  Are you currently or planning to be nursing or breast feeding? No   PAST MEDICAL HISTORY:  Have you personally ever had or currently have any of the following? If "YES," then please provide more detail. Skin cancer (such as Melanoma, Basal Cell Carcinoma, Squamous Cell Carcinoma? No  Tuberculosis, HIV/AIDS, Hepatitis B or C: No  Radiation Treatment No   HISTORY OF IMMUNOSUPPRESSION:   Do you have a history of any of the following:  Systemic Immunosuppression such as Diabetes, Biologic or Immunotherapy, Chemotherapy, Organ Transplantation, Bone Marrow Transplantation? No    Answering "YES" requires the addition of the dotphrase "IMMUNOSUPPRESSED" as the first diagnosis of the patient's visit. FAMILY HISTORY:  Any "first degree relatives" (parent, brother, sister, or child) with the following? Skin Cancer, Pancreatic or Other Cancer? No   PATIENT EXPERIENCE:    Do you want the Dermatologist to perform a COMPLETE skin exam today including a clinical examination under the "bra and underwear" areas? NO  If necessary, do we have your permission to call and leave a detailed message on your Preferred Phone number that includes your specific medical information?   Yes      Allergies   Allergen Reactions    Bupropion Other (See Comments)    Carbidopa W-Levodopa Other (See Comments)    Lisinopril Other (See Comments)      Current Outpatient Medications:     atorvastatin (LIPITOR) 80 mg tablet, Take 80 mg by mouth daily at bedtime, Disp: , Rfl:     betamethasone valerate (VALISONE) 0.1 % ointment, Apply 1 application topically 2 (two) times a day To affected areas, Disp: , Rfl:     calcipotriene (DOVONOX) 0.005 % ointment, Apply once daily to plaques on skin, Disp: 120 g, Rfl: 2    clobetasol (TEMOVATE) 5.88 % cream, 1 Application 2 (two) times a day To affected areas, Disp: , Rfl:     escitalopram (Lexapro) 10 mg tablet, Escitalopram 10m tablet po daily, Disp: 30 tablet, Rfl: 1    levothyroxine 25 mcg tablet, Take 25 mcg by mouth daily, Disp: , Rfl:     LORazepam (ATIVAN) 0.5 mg tablet, Take 0.5 mg by mouth daily at bedtime, Disp: , Rfl:     metoprolol succinate (TOPROL-XL) 25 mg 24 hr tablet, Take 25 mg by mouth 2 (two) times a day, Disp: , Rfl:     traZODone (DESYREL) 50 mg tablet, Trazodone 50m-2 tablet po at night, Disp: 180 tablet, Rfl: 0    triamcinolone (KENALOG) 0.1 % lotion, Apply once daily to scalp, Disp: 60 mL, Rfl: 2    triamcinolone (KENALOG) 0.1 % ointment, Apply once daily to plaques on skin, Disp: 453 g, Rfl: 1    risperiDONE (RisperDAL) 1 mg tablet, Risperidone 1 mg : 1 tablet in the morning x 1 wk. Then 1 tablet twice daily ( morning and night) (Patient not taking: Reported on 11/15/2023), Disp: 60 tablet, Rfl: 1    rOPINIRole (REQUIP) 0.25 mg tablet, Take 0.25 mg by mouth daily at bedtime (Patient not taking: Reported on 11/15/2023), Disp: , Rfl:           Whom besides the patient is providing clinical information about today's encounter? NO ADDITIONAL HISTORIAN (patient alone provided history)    Physical Exam and Assessment/Plan by Diagnosis:    PSORIASIS    Physical Exam:  Anatomic Location Affected:  trunk, left lower leg,umbilical, scalp, upper arms,  Morphological Description: Thick plaques, excoriated   Severity: severe  Body Percent Affected: 10%  Pertinent Positives:  Pertinent Negatives:     Additional History of Present Condition:  Patient present for rash on the legs, abdomen, elbows, scalp. Tx with betamethasone and clobetasol not helping. Pt states she has skyrizi and did help    Assessment and Plan:  Based on a thorough discussion of this condition and the management approach to it (including a comprehensive discussion of the known risks, side effects and potential benefits of treatment), the patient (family) agrees to implement the following specific plan:  Start triamcinolone 0.1% ointment apply once daily to plaque on skin at night   Start triamcinolone 0.1% lotion apply daily to scalp  Start calcipotriene 0.005% apply daily to plaques on skin in the morning  Lab order chronic Hepatitis panel and Quantiferon gold   No response to topical   Unable to comply with phototherapy due distant from office   We will seek approval of khushi  which she has take in past.  Methotrexate is contraindicated due ot high body mass index and risk of fatty liver. Due to hyperglycema and hypertension cyclosporin is contraindicated. She does not have access to phototerapy within a reasonable radius of her residence and can not obtain transportation. Psoriasis is a chronic inflammatory condition that causes the body to make new skin cells in days rather than weeks. As these cells pile up on the surface of the skin, you may see thick, scaly patches of thickened skin. Psoriasis affects 2-4% of males and females. It can start at any age including childhood, with peaks of onset at 15-25 years and 50-60 years. It tends to persist lifelong, fluctuating in extent and severity. It is particularly common in Caucasians but may affect people of any race. About one-third of patients with psoriasis have family members with psoriasis. Psoriasis is multifactorial. It is classified as an immune-mediated inflammatory disease (IMID). Genetic factors are important and influence the type of psoriasis and response to treatment. What are the signs and symptoms of psoriasis?     There are many different types of psoriasis that each have present uniquely. The types of psoriasis include:    Plaque psoriasis: About 80% to 90% of people who have psoriasis develop this type. When plaque psoriasis appears, you may see:  Plaque psoriasis usually presents with symmetrically distributed, red, scaly plaques with well-defined edges. The scale is typically silvery white, except in skin folds where the plaques often appear shiny and they may have a moist peeling surface. The most common sites are scalp, elbows and knees, but any part of the skin can be involved. The plaques are usually very persistent without treatment. Itch is mostly mild but may be severe in some patients, leading to scratching and lichenification (thickened leathery skin with increased skin markings). Painful skin cracks or fissures may occur. When psoriatic plaques clear up, they may leave brown or pale marks that can be expected to fade over several months. Guttate psoriasis: When someone gets this type of psoriasis, you often see tiny bumps appear on the skin quite suddenly. The bumps tend to cover much of the torso, legs, and arms. Sometimes, the bumps also develop on the face, scalp, and ears. No matter where they appear, the bumps tend to be:   Small and scaly  [place]-colored to pink  Temporary, clearing in a few weeks or months without treatment  When guttate psoriasis clears, it may never return. Why this happens is still a bit of a mystery. Guttate psoriasis tends to develop in children and young adults who've had an infection, such as strep throat. It's possible that when the infection clears so does guttate psoriasis. It's also possible to have:  Guttate psoriasis for life  See the guttate psoriasis clear and plaque psoriasis develop later in life  Plaque psoriasis when you develop guttate psoriasis  There's no way to predict what will happen after the first flare-up of guttate psoriasis clears. Inverse psoriasis:  This type of psoriasis develops in areas where skin touches skin, such as the armpits, genitals, and crease of the buttocks. Where the inverse psoriasis appears, you're likely to notice:  Smooth, red patches of skin that look raw  Little, if any, silvery-white coating  Sore or painful skin  Other names for this type of psoriasis are intertriginous psoriasis or flexural psoriasis. Pustular psoriasis: This type of psoriasis causes pus-filled bumps that usually appear only on the feet and hands. While the pus-filled bumps may look like an infection, the skin is not infected. The bumps don't contain bacteria or anything else that could cause an infection. Where pustular psoriasis appears, you tend to notice:  Red, swollen skin that is dotted with pus-filled bumps  Extremely sore or painful skin  Brown dots (and sometimes scale) appear as the pus-filled bumps dry  Pustular psoriasis can make just about any activity that requires your hands or feet, such as typing or walking, unbearably painful. Pustular psoriasis (generalized): Serious and life-threatening, this rare type of psoriasis causes pus-filled bumps to develop on much of the skin. Also called von Zumbusch psoriasis, a flare-up causes this sequence of events:  Skin on most of the body suddenly turns dry, red, and tender. Within hours, pus-filled bumps cover most of the skin. Often within a day, the pus-filled bumps break open and pools of pus leak onto the skin. As the pus dries (usually within 24 to 48 hours), the skin dries out and peels (as shown in this picture). When the dried skin peels off, you see a smooth, glazed surface. In a few days or weeks, you may see a new crop of pus-filled bumps covering most of the skin, as the cycle repeats itself. Anyone with pustular psoriasis also feels very sick, and may develop a fever, headache, muscle weakness, and other symptoms. Medical care is often necessary to save the person's life.     Erythrodermic psoriasis: Serious and life-threatening, this type of psoriasis requires immediate medical care. When someone develops erythrodermic psoriasis, you may notice:  Skin on most of the body looks burnt  Chills, fever, and the person looks extremely ill  Muscle weakness, a rapid pulse, and severe itch  The person may also be unable to keep warm, so hypothermia can set in quickly. Most people who develop this type of psoriasis already have another type of psoriasis. Before developing erythrodermic psoriasis, they often notice that their psoriasis is worsening or not improving with treatment. If you notice either of these happening, see a board-certified dermatologist.    Nails    Nail psoriasis: With any type of psoriasis, you may see changes to your fingernails or toenails. About half of the people who have plaque psoriasis see signs of psoriasis on their fingernails at some point2. When psoriasis affects the nails, you may notice:  Tiny dents in your nails (called “nail pits”)  White, yellow, or brown discoloration under one or more nails  Crumbling, rough nails  A nail lifting up so that it's no longer attached  Buildup of skin cells beneath one or more nails, which lifts up the nail  Treatment and proper nail care can help you control nail psoriasis. Psoriatic arthritis: If you have psoriasis, it's important to pay attention to your joints. Some people who have psoriasis develop a type of arthritis called psoriatic arthritis. This is more likely to occur if you have severe psoriasis. Most people notice psoriasis on their skin years before they develop psoriatic arthritis. It's also possible to get psoriatic arthritis before psoriasis, but this is less common. When psoriatic arthritis develops, the signs can be subtle.  At first, you may notice:  A swollen and tender joint, especially in a finger or toe  Heel pain  Swelling on the back of your leg, just above your heel  Stiffness in the morning that fades during the day  Like psoriasis, psoriatic arthritis cannot be cured. Treatment can prevent psoriatic arthritis from worsening, which is important. Allowed to progress, psoriatic arthritis can become disabling. Diagnosis and treatment of psoriasis   Psoriasis is usually diagnosed by clinical features, and skin biopsy if necessary. It is important to decrease factors that aggravate psoriasis. These include treating streptococcal infections, minimizing skin injuries, avoiding sun exposure if it exacerbates psoriasis, smoking, alcohol usage, decreasing stress, and maintaining an optimal body weight. Certain medications such as lithium, beta blockers, antimalarials, and NSAIDs have also been implicated. Suddenly stopping oral steroids or strong topical steroids can cause rebound disease. There are many categories of psoriasis treatments available. Topical therapy  Mild psoriasis is generally treated with topical agents alone. Which treatment is selected may depend on body site, extent and severity of psoriasis. Emollients  Coal tar preparations  Dithranol  Salicylic acid  Vitamin D analogue (calcipotriol)  Topical corticosteroids  Calcineurin inhibitor (tacrolimus, pimecrolimus)  Phototherapy  Most psoriasis centres offer phototherapy with ultraviolet (UV) radiation, often in combination with topical or systemic agents. Types of phototherapy include  Narrowband UVB  Broadband UVB  Photochemotherapy (PUVA)  Targeted phototherapy  Systemic therapy  Moderate to severe psoriasis warrants treatment with a systemic agent and/or phototherapy. The most common treatments are:  Methotrexate  Ciclosporin  Acitretin  Other medicines occasionally used for psoriasis include:  Mycophenolate  Apremilast  Hydroxyurea  Azathioprine  6-mercaptopurine  Systemic corticosteroids are best avoided due to a risk of severe withdrawal flare of psoriasis and adverse effects.   Biologics or targeted therapies are reserved for conventional treatment-resistant severe psoriasis, mainly because of expense, as side effects compare favorably with other systemic agents. These include:  Anti-tumour necrosis factor-alpha antagonists (anti-TNF?) infliximab, adalimumab and etanercept  The interleukin (IL)-12/23 antagonist ustekinumab  IL-17 antagonists such as secukinumab  Many other monoclonal antibodies are under investigation in the treatment of psoriasis.    Scribe Attestation      I,:  Alejandrina Dean am acting as a scribe while in the presence of the attending physician.:       I,:  Tish Main MD personally performed the services described in this documentation    as scribed in my presence.:

## 2023-11-16 ENCOUNTER — TELEPHONE (OUTPATIENT)
Age: 58
End: 2023-11-16

## 2023-11-16 DIAGNOSIS — L40.9 PSORIASIS: Primary | ICD-10-CM

## 2023-11-17 NOTE — TELEPHONE ENCOUNTER
Prior Authorization for Rosalinda Smith was submitted to insurance along with recent clinical notes through covermyeds- Key#[id number]

## 2023-11-18 LAB
GAMMA INTERFERON BACKGROUND BLD IA-ACNC: 0.02 IU/ML
HBV CORE AB SERPL QL IA: NEGATIVE
HBV SURFACE AB SER QL: NON REACTIVE
HBV SURFACE AG SERPL QL IA: NEGATIVE
HCV AB S/CO SERPL IA: NON REACTIVE
M TB IFN-G BLD-IMP: NEGATIVE
M TB IFN-G CD4+ T-CELLS BLD-ACNC: 0.04 IU/ML
M TB IFN-G CD4+ T-CELLS BLD-ACNC: 0.05 IU/ML
MITOGEN IGNF BLD-ACNC: >10 IU/ML
SERVICE CMNT-IMP: NORMAL
SL AMB INTERPRETATION: NORMAL

## 2023-11-20 NOTE — TELEPHONE ENCOUNTER
Prior authorization for taltz was submitted to insurance along with recent clinical notes through [facility]- Key#[id number]

## 2023-11-20 NOTE — TELEPHONE ENCOUNTER
Received denial for the coverage of Skyrizi, medication was denied due to not being one of the preferred medications on formulary. Please see below:        Denial was uploaded into chart and routed to clinical staff.

## 2023-11-20 NOTE — TELEPHONE ENCOUNTER
I left message for patient. I asked her to call us and verify that there is no history of colitis or inflamatory bowel disease. I noted khushi is not on approved list by insurance, but taltz would be excellent choice. Will proceed with taltz.

## 2023-11-20 NOTE — TELEPHONE ENCOUNTER
Patient called back and confirmed she does not have a history of colitis or inflammatory bowel disease and agreed to proceed w/ taltz.

## 2023-11-21 ENCOUNTER — TELEPHONE (OUTPATIENT)
Age: 58
End: 2023-11-21

## 2023-11-21 NOTE — TELEPHONE ENCOUNTER
Justino Sands [phone] [facility] insurance plan prior auth approved but needs clarification please call for clarification. Medication name Sherrin Mccollum 80mg/ml auto injector. Prescribed by dr marion dermatology. Reason for call:   [x] Prior Auth clarification  [] Other:     Caller:  [] Patient  [] Pharmacy  Name:   Address:   Ed Gear Number:   Other insurance company [facility]    Medication: Taltz 80mg/ml auto injector    Dose/Frequency: ?    Quantity: ?    Ordering Provider:   [] PCP/Provider -   [x] Speciality/Provider -     Has the patient tried other medications and failed? If failed, which medications did they fail? [] No   [] Yes -   ? Is the patient's insurance updated in EPIC? [] Yes   [] No   ?    Is a copy of the patient's insurance scanned in EPIC?    [] Yes   [] No    ?

## 2023-11-22 RX ORDER — IXEKIZUMAB 80 MG/ML
INJECTION, SOLUTION SUBCUTANEOUS
Qty: 2 ML | Refills: 1 | Status: SHIPPED | OUTPATIENT
Start: 2023-11-22

## 2023-11-22 RX ORDER — IXEKIZUMAB 80 MG/ML
INJECTION, SOLUTION SUBCUTANEOUS
Qty: 1 ML | Refills: 9 | Status: SHIPPED | OUTPATIENT
Start: 2023-11-22

## 2023-11-22 RX ORDER — IXEKIZUMAB 80 MG/ML
INJECTION, SOLUTION SUBCUTANEOUS
Qty: 4 ML | Refills: 0 | Status: SHIPPED | OUTPATIENT
Start: 2023-11-22

## 2023-12-02 PROBLEM — Z01.419 ENCOUNTER FOR GYNECOLOGICAL EXAMINATION WITHOUT ABNORMAL FINDING: Status: RESOLVED | Noted: 2023-10-03 | Resolved: 2023-12-02

## 2023-12-02 PROBLEM — Z12.4 SCREENING FOR CERVICAL CANCER: Status: RESOLVED | Noted: 2023-10-03 | Resolved: 2023-12-02

## 2023-12-20 ENCOUNTER — TELEPHONE (OUTPATIENT)
Dept: DERMATOLOGY | Facility: CLINIC | Age: 58
End: 2023-12-20

## 2024-07-03 ENCOUNTER — OFFICE VISIT (OUTPATIENT)
Age: 59
End: 2024-07-03
Payer: COMMERCIAL

## 2024-07-03 VITALS
BODY MASS INDEX: 38.71 KG/M2 | TEMPERATURE: 98.5 F | HEART RATE: 75 BPM | DIASTOLIC BLOOD PRESSURE: 68 MMHG | HEIGHT: 59 IN | OXYGEN SATURATION: 96 % | WEIGHT: 192 LBS | SYSTOLIC BLOOD PRESSURE: 112 MMHG

## 2024-07-03 DIAGNOSIS — R06.02 SHORTNESS OF BREATH: Primary | ICD-10-CM

## 2024-07-03 PROCEDURE — 99244 OFF/OP CNSLTJ NEW/EST MOD 40: CPT | Performed by: INTERNAL MEDICINE

## 2024-07-03 RX ORDER — CLOPIDOGREL BISULFATE 75 MG/1
75 TABLET ORAL DAILY
COMMUNITY

## 2024-07-03 RX ORDER — ISOSORBIDE MONONITRATE 30 MG/1
30 TABLET, EXTENDED RELEASE ORAL DAILY
COMMUNITY

## 2024-07-03 RX ORDER — PRAMIPEXOLE DIHYDROCHLORIDE 1 MG/1
1 TABLET ORAL
COMMUNITY
Start: 2024-05-15

## 2024-07-03 NOTE — PROGRESS NOTES
Pulmonary Initial Visit  Ann Rowe 59 y.o. female MRN: [id number]  @ Encounter: [id number]      Impressions/Recommendations:   Patient is 59-year-old female history of CAD who presents to establish pulmonary care.  Patient reports that it was noticed that she was breathing heavy by both her mom and PCP.  The etiology of the breathing is unclear.  Patient would benefit from PFTs but this may be completed after her PCI.  The patient was counseled on need for weight loss that she knows she is gained significant weight in the last 2 to 3 years which may be playing a significant role.  Otherwise, patient may continue her CPAP.    Heavy breathing  -  unclear etiology  -  mild shortness of breath  -  check PFTs    CAD   -  planned for PCI  -  may complete testing after PCI    Obesity   -  counseled on need for weight loss    ALEN  -  continue CPAP    Patient may follow up in 3-4 months or sooner as necessary.     Orders Placed This Encounter   Procedures    Complete PFT with post bronchodilator     Standing Status:   Future     Standing Expiration Date:   7/3/2025     Order Specific Question:   Would you like to add MVV, MIP, MEP into this order?     Answer:   No     History of Present Illness   HPI:  Ann Rowe is a 59 y.o. female with pmhx sig for CAD who present to establish pulmonary care in setting of heavy breathing.    The patient presents for evaluation of breathing heavy.  She feels that she has been breathing this way for years. She denies shortness of breath.  She denies avoiding activity.    She had a 2V CABG in 2021.  She is having a stent placed next week at [facility].  She underwent a nuclear stress test.  She denies chest pain or chest heaviness.    She is a never smoker.  She denies etoh or illicit drug use.  She denies family history of lung disease.      The patient works in [employer] and as a  at [employer].  She denies dust, gas, fume exposures.    She has a dog but no birds.      She denies  hobbies with significant exposures.      She denies any inhalers or nebulizers.      She will notice some slight difficulty with going up stairs.  She notes its about the same as last year.    She denies exercise.    She has gained about 30lbs since her heart attack.    She has sleep apnea and is compliant with her machine on a nightly basis.  This is through [employer].      Review of systems:  Review of systems was completed and was otherwise negative except as listed in HPI.    Historical Information   Past Medical History:   Diagnosis Date    Depression     Disease of thyroid gland     History of MI (myocardial infarction) 08/2021    LVH    Hyperlipidemia     Hypertension     Psoriasis     Lange's syndrome      Past Surgical History:   Procedure Laterality Date    CARDIAC SURGERY  2021    double bypass    TONSILLECTOMY       Family History   Problem Relation Age of Onset    Breast cancer Mother     Alzheimer's disease Father     Breast cancer Sister     Breast cancer Maternal Grandmother     Cancer Maternal Grandmother         oral    Cancer Paternal Grandfather     Diabetes Paternal Grandfather     Ovarian cancer Neg Hx     Colon cancer Neg Hx        Social History     Socioeconomic History    Marital status:      Spouse name: None    Number of children: None    Years of education: None    Highest education level: None   Occupational History    None   Tobacco Use    Smoking status: Never     Passive exposure: Never    Smokeless tobacco: Never   Vaping Use    Vaping status: Never Used   Substance and Sexual Activity    Alcohol use: Never    Drug use: Never    Sexual activity: Not Currently   Other Topics Concern    None   Social History Narrative    None     Social Determinants of Health     Financial Resource Strain: Not on file   Food Insecurity: Not on file   Transportation Needs: Not on file   Physical Activity: Not on file   Stress: Not on file   Social Connections: Not on file   Intimate Partner  "Violence: Not on file   Housing Stability: Not on file       Meds/Allergies   No current facility-administered medications for this visit.     Not in a hospital admission.  Allergies   Allergen Reactions    Bupropion Other (See Comments)    Carbidopa W-Levodopa Other (See Comments)    Lisinopril Other (See Comments)       Vitals: Blood pressure 112/68, pulse 75, temperature 98.5 °F (36.9 °C), temperature source Tympanic, height 4' 10.5\" (1.486 m), weight 87.1 kg (192 lb), SpO2 96%., RA, Body mass index is 39.45 kg/m².    Physical Exam  General: Pleasant, Awake alert and oriented x 3, conversant without conversational dyspnea, NAD, normal affect  HEENT:  PERRL, Sclera noninjected, nonicteric OU, Nares patent, no nasal flaring, no nasal drainage, Mucous membranes, moist, no oral lesions, normal dentition  NECK: Trachea midline, no accessory muscle use, no stridor, no cervical or supraclavicular adenopathy, JVP not elevated  CARDIAC: Reg, single s1/S2, no m/r/g  PULM: CTA b/l  CHEST: No gross deformities, equal chest expansion on inspiration bilaterally  ABD: Normoactive bowel sounds, soft nontender, nondistended, no rebound, no rigidity, no guarding  EXT: No cyanosis, no clubbing, no edema, normal capillary refill  SKIN:  No rashes, no lesions  NEURO: no focal neurologic deficits, AAOx3, moving all extremities appropriately    Labs: I have personally reviewed pertinent lab results.  Lab Results   Component Value Date    SODIUM 143 12/27/2022    K 4.1 12/27/2022     12/27/2022    CO2 21 12/27/2022    BUN 18 12/27/2022    CREATININE 0.91 12/27/2022    GLUC 105 (H) 12/27/2022    CALCIUM 8.2 (L) 09/03/2021     Lab Results   Component Value Date    WBC 4.9 12/27/2022    HGB 13.0 12/27/2022    HCT 38.2 12/27/2022    MCV 91 12/27/2022     12/27/2022     Imaging and other studies: I have personally reviewed pertinent reports.    CXR 9/17/2021  Two-view study was done and compared to 9/2/2021 and 9/1/2020. Heart " is normal in size. There are small bilateral pleural effusions left greater than right with left basilar subsegmental and compressive atelectasis. This appears slightly improved on the left when compared to the prior exam. There has been clearing of right basilar subsegmental atelectasis. Sternotomy wires are present.     Pulmonary function testing:    No pulmonary function testing available for review.     Echocardiogram: I have personally reviewed pertinent reports.    8/2021:  Left Ventricle   Left ventricle is normal in size. Wall thickness is normal. Systolic function is low normal with an ejection fraction of 50-55%. Basal-mid inferior hypokinesis. There is grade I (mild) diastolic dysfunction and normal left atrial pressure.  Right Ventricle   Right ventricle cavity is normal. Systolic function is normal.  Left Atrium   Left atrium cavity size is normal.  Right Atrium   Right atrium cavity is normal.  IVC/SVC   The inferior vena cava demonstrates a diameter of <=21 mm and collapses >50%; therefore, the right atrial pressure is estimated at 0-5 mmHg.  Mitral Valve Mitral valve structure is normal. There is trace regurgitation. There is no evidence of mitral valve stenosis.  Tricuspid Valve Tricuspid valve structure is normal. There is no regurgitation or stenosis.  Aortic Valve The aortic valve is trileaflet. There is no regurgitation or stenosis.  Pulmonic Valve Pulmonic valve structure is normal. There is no regurgitation or stenosis. PA pressure not calculated due to incomplete TR signal.   Ascending Aorta  The aorta appears normal in size.   Pericardium There is no pericardial effusion.     EKG, Pathology, and Other Studies: I have personally reviewed pertinent reports.    EKG 9/2019:  incomplete bundle branch block; NSR    Referring:  Gab Gupta, DO  [address] [place] [id number]    Perfecto Pike MD  [facility]

## 2024-08-29 ENCOUNTER — HOSPITAL ENCOUNTER (OUTPATIENT)
Dept: PULMONOLOGY | Facility: HOSPITAL | Age: 59
End: 2024-08-29
Attending: INTERNAL MEDICINE
Payer: COMMERCIAL

## 2024-08-29 DIAGNOSIS — R06.02 SHORTNESS OF BREATH: ICD-10-CM

## 2024-08-29 PROCEDURE — 94726 PLETHYSMOGRAPHY LUNG VOLUMES: CPT

## 2024-08-29 PROCEDURE — 94729 DIFFUSING CAPACITY: CPT | Performed by: INTERNAL MEDICINE

## 2024-08-29 PROCEDURE — 94060 EVALUATION OF WHEEZING: CPT | Performed by: INTERNAL MEDICINE

## 2024-08-29 PROCEDURE — 94726 PLETHYSMOGRAPHY LUNG VOLUMES: CPT | Performed by: INTERNAL MEDICINE

## 2024-08-29 PROCEDURE — 94729 DIFFUSING CAPACITY: CPT

## 2024-08-29 PROCEDURE — 94760 N-INVAS EAR/PLS OXIMETRY 1: CPT

## 2024-08-29 PROCEDURE — 94060 EVALUATION OF WHEEZING: CPT

## 2024-08-29 RX ORDER — ALBUTEROL SULFATE 0.83 MG/ML
2.5 SOLUTION RESPIRATORY (INHALATION) ONCE
Status: COMPLETED | OUTPATIENT
Start: 2024-08-29 | End: 2024-08-29

## 2024-08-29 RX ADMIN — ALBUTEROL SULFATE 2.5 MG: 2.5 SOLUTION RESPIRATORY (INHALATION) at 14:14

## 2024-09-09 ENCOUNTER — TELEPHONE (OUTPATIENT)
Dept: PULMONOLOGY | Facility: CLINIC | Age: 59
End: 2024-09-09

## 2024-09-20 ENCOUNTER — OFFICE VISIT (OUTPATIENT)
Age: 59
End: 2024-09-20
Payer: COMMERCIAL

## 2024-09-20 VITALS
DIASTOLIC BLOOD PRESSURE: 64 MMHG | BODY MASS INDEX: 37.5 KG/M2 | HEART RATE: 80 BPM | SYSTOLIC BLOOD PRESSURE: 118 MMHG | TEMPERATURE: 98.6 F | HEIGHT: 59 IN | WEIGHT: 186 LBS | OXYGEN SATURATION: 97 %

## 2024-09-20 DIAGNOSIS — G47.33 OSA (OBSTRUCTIVE SLEEP APNEA): ICD-10-CM

## 2024-09-20 DIAGNOSIS — M79.89 SWELLING OF LOWER EXTREMITY: ICD-10-CM

## 2024-09-20 DIAGNOSIS — R06.02 SHORTNESS OF BREATH: Primary | ICD-10-CM

## 2024-09-20 PROCEDURE — 99214 OFFICE O/P EST MOD 30 MIN: CPT | Performed by: NURSE PRACTITIONER

## 2024-09-20 RX ORDER — DILTIAZEM HYDROCHLORIDE 120 MG/1
120 CAPSULE, COATED, EXTENDED RELEASE ORAL DAILY
COMMUNITY
Start: 2024-08-13

## 2024-09-20 RX ORDER — FUROSEMIDE 40 MG
40 TABLET ORAL DAILY
COMMUNITY
Start: 2024-07-19 | End: 2025-07-19

## 2024-09-20 NOTE — PROGRESS NOTES
Pulmonary Follow-Up Note   Ann Rowe 59 y.o. female MRN: [id number]  9/20/2024      Assessment/Plan:    Problem List Items Addressed This Visit       Shortness of breath - Primary     She has significant shortness of breath and we did review recent PFT findings: a very mild restrictive pattern was identified and in Ann's case I feel it likely is due to BMI of 39.     Obesity and deconditioning can certainly trigger shortness of breath but more concerning is her underlying coronary artery disease which is significant and likely the primary cause of dyspnea at this time. She is following closely with cardiology who may be planning further diagnostics/intervention.         ALEN (obstructive sleep apnea)     On CPAP   AirSense 11 Auto Set delivering CPAP 5/20 cmH2O compliance reviewed 6/22/24-9/19/24  She is using 81/90 days (90%) for average use of 6h 3m, >4h about 72% of the time  Residual AHI 2.6  There is intermittent leak of 29L/m through the 95th percentile    Likely we will take over ordering for this machine and she may need a new water basin in the future however will observe at the next visit; she will change the water basin as scheduled  Continue for all hours of sleep - but a minimum of 4h per night please         Swelling of lower extremity     Bilateral and she is taking Lasix daily  I have asked her to speak with Cardiology on this issue. +1-+2 swelling without pain or discomfort.          Vaccines: up to date    Return in about 6 months (around 3/20/2025).    All of Ann's questions were answered prior to leaving the office today.  She is aware to call our office with any further questions or concerns.    History of Present Illness   Reason for Visit: Follow up  Chief Complaint: none  HPI: Ann Rowe is a 59 y.o. female who presents to the office today for follow up and to review recent test results. She did have balloon angioplasty of LCX/OM in July at [facility] and a stent was placed to the OM;  aggressive medical therapy was recommended due to symptomatic disease and she will need further intervention at tertiary care facility in the near future. She is following closely with Cardiology at [facility].    Breathing symptoms have not been bothering her. She has no significant cough, tightness or wheezing. Shortness of breath remains at her baseline. She is attending pulmonary rehab and just restarted today.    She admits she struggles with using her PAP machine at times - machine sometimes will indicate a leak. She is not aware of any leaking from a mask standpoint - feels the leak might be from behind the water basin.    Review of Systems   Constitutional:  Negative for appetite change and fever.   HENT:  Positive for postnasal drip. Negative for ear pain, rhinorrhea, sneezing, sore throat and trouble swallowing.    Respiratory:  Positive for cough, shortness of breath and wheezing.    Cardiovascular:  Negative for chest pain.   Musculoskeletal:  Positive for myalgias.   Neurological:  Positive for headaches.     Please note that a 14-point review of systems was performed to include Constitutional, HEENT, Respiratory, CVS, GI, , Musculoskeletal, Integumentary, Neurologic, Rheumatologic, Endocrinologic, Psychiatric, Lymphatic, and Hematologic/Oncologic systems were reviewed and are negative unless otherwise stated in HPI. Positive and negative findings pertinent to this evaluation are incorporated into the history of present illness.       Historical Information   Past Medical History:   Diagnosis Date    Depression     Disease of thyroid gland     History of MI (myocardial infarction) 08/2021    LVH    Hyperlipidemia     Hypertension     Psoriasis     Lange's syndrome      Past Surgical History:   Procedure Laterality Date    CARDIAC SURGERY  2021    double bypass    TONSILLECTOMY       Family History   Problem Relation Age of Onset    Breast cancer Mother     Alzheimer's disease Father     Breast cancer  Sister     Breast cancer Maternal Grandmother     Cancer Maternal Grandmother         oral    Cancer Paternal Grandfather     Diabetes Paternal Grandfather     Ovarian cancer Neg Hx     Colon cancer Neg Hx      Social History   Social History     Substance and Sexual Activity   Alcohol Use Never     Social History     Substance and Sexual Activity   Drug Use Never     Social History     Tobacco Use   Smoking Status Never    Passive exposure: Never   Smokeless Tobacco Never     E-Cigarette/Vaping    E-Cigarette Use Never User      E-Cigarette/Vaping Substances       Meds/Allergies     Current Outpatient Medications:     atorvastatin (LIPITOR) 80 mg tablet, Take 80 mg by mouth daily at bedtime, Disp: , Rfl:     clobetasol (TEMOVATE) 0.05 % cream, 1 Application 2 (two) times a day To affected areas, Disp: , Rfl:     clopidogrel (PLAVIX) 75 mg tablet, Take 75 mg by mouth daily, Disp: , Rfl:     diltiazem (CARDIZEM CD) 120 mg 24 hr capsule, Take 120 mg by mouth daily, Disp: , Rfl:     furosemide (LASIX) 40 mg tablet, Take 40 mg by mouth daily, Disp: , Rfl:     Ixekizumab (Taltz) 80 MG/ML SOAJ, Inject 160mg (2 pens) subcutaneously on week 0 then 80 mg (1 pen) on week 2 and 4 for loading dose., Disp: 4 mL, Rfl: 0    levothyroxine 25 mcg tablet, Take 25 mcg by mouth daily, Disp: , Rfl:     LORazepam (ATIVAN) 0.5 mg tablet, Take 0.5 mg by mouth daily at bedtime, Disp: , Rfl:     pramipexole (MIRAPEX) 1 mg tablet, Take 1 mg by mouth, Disp: , Rfl:     traZODone (DESYREL) 50 mg tablet, Trazodone 50m-2 tablet po at night, Disp: 180 tablet, Rfl: 0    triamcinolone (KENALOG) 0.1 % lotion, Apply once daily to scalp, Disp: 60 mL, Rfl: 2    triamcinolone (KENALOG) 0.1 % ointment, Apply once daily to plaques on skin, Disp: 453 g, Rfl: 1    escitalopram (Lexapro) 10 mg tablet, Escitalopram 10m tablet po daily (Patient not taking: Reported on 7/3/2024), Disp: 30 tablet, Rfl: 1    isosorbide mononitrate (IMDUR) 30 mg 24 hr  "tablet, Take 30 mg by mouth daily (Patient not taking: Reported on 9/20/2024), Disp: , Rfl:     Ixekizumab (Taltz) 80 MG/ML SOAJ, Inject 80mg (1 pen) subcutaneously on week 16 then every 4 weeks there after for maintenance dose. (Patient not taking: Reported on 7/3/2024), Disp: 1 mL, Rfl: 9    metoprolol succinate (TOPROL-XL) 25 mg 24 hr tablet, Take 25 mg by mouth 2 (two) times a day (Patient not taking: Reported on 9/20/2024), Disp: , Rfl:     risperiDONE (RisperDAL) 1 mg tablet, Risperidone 1 mg : 1 tablet in the morning x 1 wk. Then 1 tablet twice daily ( morning and night) (Patient not taking: Reported on 11/15/2023), Disp: 60 tablet, Rfl: 1    rOPINIRole (REQUIP) 0.25 mg tablet, Take 0.25 mg by mouth daily at bedtime (Patient not taking: Reported on 11/15/2023), Disp: , Rfl:   Allergies   Allergen Reactions    Bupropion Other (See Comments)    Carbidopa W-Levodopa Other (See Comments)    Lisinopril Other (See Comments)       Vitals: Blood pressure 118/64, pulse 80, temperature 98.6 °F (37 °C), temperature source Tympanic, height 4' 10.5\" (1.486 m), weight 84.4 kg (186 lb), SpO2 97%. Body mass index is 38.21 kg/m². Oxygen Therapy  SpO2: 97 %  Oxygen Therapy: None (Room air)      Physical Exam  Vitals reviewed.   Constitutional:       Appearance: Normal appearance.   HENT:      Head: Normocephalic.      Nose: Nose normal.      Mouth/Throat:      Mouth: Mucous membranes are moist.      Pharynx: Oropharynx is clear.   Cardiovascular:      Rate and Rhythm: Normal rate and regular rhythm.      Pulses: Normal pulses.      Heart sounds: Normal heart sounds.   Pulmonary:      Effort: Pulmonary effort is normal.      Breath sounds: Normal breath sounds.   Musculoskeletal:         General: Normal range of motion.   Skin:     General: Skin is warm.      Capillary Refill: Capillary refill takes less than 2 seconds.   Neurological:      General: No focal deficit present.      Mental Status: She is alert and oriented to " "person, place, and time.   Psychiatric:         Mood and Affect: Mood normal.         Behavior: Behavior normal.         Imaging and other studies: reviewed via Care Everywhere  CXR 7/12/24 at [facility]  Decreased but persistent mild congestive heart failure with small bilateral   pleural effusions.     CXR 7/10/24 at [facility]  Cardiomegaly with suspected moderate interstitial edema/congestive failure.     Pulmonary Results (PFTs, PSG): Reviewed radiology reports from this admission including: procedure reports.  PFT 8/29/24  Results:  Spirometry:  FEV1/FVC Ratio: 88 %                                    FEV1: 86 % predicted              Forced Vital Capacity: 79 % predicted             After administration of bronchodilator: No change     Lung volumes:   Total Lung Capacity 71 % predicted              Residual volume 76 % predicted     DLCO corrected for patients hemoglobin level: 73 % predicted                   Flow volume loop: There is blunting of the inspiratory limb suggestive of possible variable extrathoracic obstruction        Interpretation:     Spirometry and lung volumes with mild restriction  No change with bronchodilators  Normal diffusion capacity  Flattening of inspiratory limb of flow volume loop which may suggest extrathoracic obstruction.  Clinical correlation advised       ROBBIN Mojica  [facility]        Portions of the record may have been created with voice recognition software.  Occasional wrong word or \"sound a like\" substitutions may have occurred due to the inherent limitations of voice recognition software.  Read the chart carefully and recognize, using context, where substitutions have occurred or contact the dictating provider.  "

## 2024-09-20 NOTE — ASSESSMENT & PLAN NOTE
On CPAP   AirSense 11 Auto Set delivering CPAP 5/20 cmH2O compliance reviewed 6/22/24-9/19/24  She is using 81/90 days (90%) for average use of 6h 3m, >4h about 72% of the time  Residual AHI 2.6  There is intermittent leak of 29L/m through the 95th percentile    Likely we will take over ordering for this machine and she may need a new water basin in the future however will observe at the next visit; she will change the water basin as scheduled  Continue for all hours of sleep - but a minimum of 4h per night please

## 2024-09-20 NOTE — ASSESSMENT & PLAN NOTE
Bilateral and she is taking Lasix daily  I have asked her to speak with Cardiology on this issue. +1-+2 swelling without pain or discomfort.

## 2024-09-20 NOTE — ASSESSMENT & PLAN NOTE
She has significant shortness of breath and we did review recent PFT findings: a very mild restrictive pattern was identified and in Ann's case I feel it likely is due to BMI of 39.     Obesity and deconditioning can certainly trigger shortness of breath but more concerning is her underlying coronary artery disease which is significant and likely the primary cause of dyspnea at this time. She is following closely with cardiology who may be planning further diagnostics/intervention.

## 2024-09-27 ENCOUNTER — TELEPHONE (OUTPATIENT)
Age: 59
End: 2024-09-27

## 2024-09-30 NOTE — PROGRESS NOTES
Psychiatric Discharge Summary- Administrative Discharge     Admit Date: See H&P for admit date  Discharge Date: 24     Discharge Diagnosis:   Patient Active Problem List   Diagnosis    Stress due to family tension    Generalized anxiety disorder    Depression    Lange's syndrome    Psoriasis    Encounter for screening mammogram for malignant neoplasm of breast    Post-menopausal    Shortness of breath    ALEN (obstructive sleep apnea)    Swelling of lower extremity        Treating Physician: Dr. Tuan DO      Presenting Problems/Pertinent Findings: See Initial H&P     Course of Treatment:See Most Recent Med Management Progress Note    The patient's primary treating provider is no longer with practice.  Patient has not responded to outreach, and has not been seen in over a year, last appointment 2023. The chart is being administratively closed at this time.  For treatment course, please request past records when patient was in treatment with primary provider..      Discharge Medications:   Current Outpatient Medications:     atorvastatin (LIPITOR) 80 mg tablet, Take 80 mg by mouth daily at bedtime, Disp: , Rfl:     clobetasol (TEMOVATE) 0.05 % cream, 1 Application 2 (two) times a day To affected areas, Disp: , Rfl:     clopidogrel (PLAVIX) 75 mg tablet, Take 75 mg by mouth daily, Disp: , Rfl:     diltiazem (CARDIZEM CD) 120 mg 24 hr capsule, Take 120 mg by mouth daily, Disp: , Rfl:     escitalopram (Lexapro) 10 mg tablet, Escitalopram 10m tablet po daily (Patient not taking: Reported on 7/3/2024), Disp: 30 tablet, Rfl: 1    furosemide (LASIX) 40 mg tablet, Take 40 mg by mouth daily, Disp: , Rfl:     isosorbide mononitrate (IMDUR) 30 mg 24 hr tablet, Take 30 mg by mouth daily (Patient not taking: Reported on 2024), Disp: , Rfl:     Ixekizumab (Taltz) 80 MG/ML SOAJ, Inject 80mg (1 pen) subcutaneously on week 16 then every 4 weeks there after for maintenance dose. (Patient not taking: Reported on  7/3/2024), Disp: 1 mL, Rfl: 9    Ixekizumab (Taltz) 80 MG/ML SOAJ, Inject 160mg (2 pens) subcutaneously on week 0 then 80 mg (1 pen) on week 2 and 4 for loading dose., Disp: 4 mL, Rfl: 0    levothyroxine 25 mcg tablet, Take 25 mcg by mouth daily, Disp: , Rfl:     LORazepam (ATIVAN) 0.5 mg tablet, Take 0.5 mg by mouth daily at bedtime, Disp: , Rfl:     metoprolol succinate (TOPROL-XL) 25 mg 24 hr tablet, Take 25 mg by mouth 2 (two) times a day (Patient not taking: Reported on 2024), Disp: , Rfl:     pramipexole (MIRAPEX) 1 mg tablet, Take 1 mg by mouth, Disp: , Rfl:     risperiDONE (RisperDAL) 1 mg tablet, Risperidone 1 mg : 1 tablet in the morning x 1 wk. Then 1 tablet twice daily ( morning and night) (Patient not taking: Reported on 11/15/2023), Disp: 60 tablet, Rfl: 1    rOPINIRole (REQUIP) 0.25 mg tablet, Take 0.25 mg by mouth daily at bedtime (Patient not taking: Reported on 11/15/2023), Disp: , Rfl:     traZODone (DESYREL) 50 mg tablet, Trazodone 50m-2 tablet po at night, Disp: 180 tablet, Rfl: 0    triamcinolone (KENALOG) 0.1 % lotion, Apply once daily to scalp, Disp: 60 mL, Rfl: 2    triamcinolone (KENALOG) 0.1 % ointment, Apply once daily to plaques on skin, Disp: 453 g, Rfl: 1

## 2024-10-11 ENCOUNTER — TELEPHONE (OUTPATIENT)
Age: 59
End: 2024-10-11

## 2024-10-11 NOTE — TELEPHONE ENCOUNTER
Patient called requesting her PFT results to be faxed over to her PCP again. Patient called previously about faxing results and stated her PCP never received the fax. Please advise.     Gab Gupta DO  Fax: [phone]

## 2024-10-31 DIAGNOSIS — L40.9 PSORIASIS: ICD-10-CM

## 2024-10-31 NOTE — TELEPHONE ENCOUNTER
Reason for call:   [x] Refill   [] Prior Auth  [] Other:     Office:   [] PCP/Provider -   [x] Specialty/Provider -  Doroteo Wilkins MD     Medication:     Ixekizumab (Taltz) 80 MG/ML SOAJ       Dose/Frequency: Inject 80mg (1 pen) subcutaneously on week 16 then every 4 weeks there after for maintenance dose.     Quantity: 1 mL    Pharmacy: [facility] - [address]     Does the patient have enough for 3 days?   [x] Yes   [] No - Send as HP to POD

## 2024-11-04 DIAGNOSIS — L40.9 PSORIASIS: ICD-10-CM

## 2024-11-04 RX ORDER — IXEKIZUMAB 80 MG/ML
INJECTION, SOLUTION SUBCUTANEOUS
Qty: 1 ML | Refills: 1 | Status: SHIPPED | OUTPATIENT
Start: 2024-11-04

## 2024-11-04 NOTE — TELEPHONE ENCOUNTER
Per long message-    Kandy Sim MA   to [facility]   [place]      11/4/24  2:51 PM  Patient needs an appointment. Please contact the patient to schedule an appointment. Last office visit...      Called pt to get pt scheduled for medication check/refills for Ixekizumab (Taltz) 80 MG/ML SOAJ-last seen 11/15/23 but n/a left v/m with c/b info. I was going to offer pt a Thursday or Friday at  with Nadira or Yoli as they are parallel with Dr. Wilkins on those days in .

## 2024-11-05 NOTE — TELEPHONE ENCOUNTER
Good afternoon Dr. Wilkins, I am waiting for this pt to call the office back to get pt scheduled. Can you please sign or decline the orders so it can be removed from the inbasket. Thank you

## 2024-11-06 RX ORDER — IXEKIZUMAB 80 MG/ML
INJECTION, SOLUTION SUBCUTANEOUS
Qty: 1 ML | Refills: 0 | OUTPATIENT
Start: 2024-11-06

## 2024-12-18 ENCOUNTER — TELEPHONE (OUTPATIENT)
Dept: DERMATOLOGY | Facility: CLINIC | Age: 59
End: 2024-12-18

## 2024-12-18 ENCOUNTER — TELEPHONE (OUTPATIENT)
Age: 59
End: 2024-12-18

## 2024-12-18 NOTE — TELEPHONE ENCOUNTER
Received fax from [facility] for refill on Taltz. Called patient to advise again that we cannot refill this medication until she is seen in the office. Patient states she works all the time and will not get her schedule until this Friday. Patient states she will call back to make an appointment once she has her new schedule.

## 2024-12-18 NOTE — TELEPHONE ENCOUNTER
Biju from [facility] calling to check the status of the Taltz request. Biju was advised that patient has not been seen since 11/2023, patient needs to be seen. Staff did reach out to schedule but patient did not have her schedule with her so she will call back on Friday.    Biju said she would call back at a later time for an update.

## 2024-12-26 NOTE — TELEPHONE ENCOUNTER
Rec'd call from Biju at [facility], checking to see if patient had a f/u scheduled yet.    Advised we are still waiting to hear back from patient. Biju will call back in a couple weeks for update. No further questions at this time.

## 2025-01-29 ENCOUNTER — OFFICE VISIT (OUTPATIENT)
Dept: DERMATOLOGY | Facility: CLINIC | Age: 60
End: 2025-01-29
Payer: COMMERCIAL

## 2025-01-29 DIAGNOSIS — L40.9 PSORIASIS: Primary | ICD-10-CM

## 2025-01-29 PROCEDURE — 99214 OFFICE O/P EST MOD 30 MIN: CPT

## 2025-01-29 RX ORDER — PRASUGREL 10 MG/1
60 TABLET, FILM COATED ORAL
COMMUNITY

## 2025-01-29 RX ORDER — LOSARTAN POTASSIUM 50 MG/1
50 TABLET ORAL DAILY
COMMUNITY

## 2025-01-29 RX ORDER — TRIAMCINOLONE ACETONIDE 1 MG/G
CREAM TOPICAL 2 TIMES DAILY
Qty: 80 G | Refills: 1 | Status: SHIPPED | OUTPATIENT
Start: 2025-01-29

## 2025-01-29 RX ORDER — IXEKIZUMAB 80 MG/ML
INJECTION, SOLUTION SUBCUTANEOUS
Qty: 1 ML | Refills: 11 | Status: SHIPPED | OUTPATIENT
Start: 2025-01-29

## 2025-01-29 NOTE — PATIENT INSTRUCTIONS
PSORIASIS    Assessment and Plan:  Based on a thorough discussion of this condition and the management approach to it (including a comprehensive discussion of the known risks, side effects and potential benefits of treatment), the patient (family) agrees to implement the following specific plan:  Taltz prior authorization started again.    CBC, CMP, and Quant TB ordered. If you can get these from your primary care provider please send them to us.   Recommend thicker cream such as Cerave Moisturizing cream, Vanicream, or Cetaphil Cream.      Psoriasis is a chronic inflammatory condition that causes the body to make new skin cells in days rather than weeks. As these cells pile up on the surface of the skin, you may see thick, scaly patches of thickened skin.   Psoriasis affects 2-4% of males and females. It can start at any age including childhood, with peaks of onset at 15-25 years and 50-60 years. It tends to persist lifelong, fluctuating in extent and severity. It is particularly common in Caucasians but may affect people of any race. About one-third of patients with psoriasis have family members with psoriasis.  Psoriasis is multifactorial. It is classified as an immune-mediated inflammatory disease (IMID). Genetic factors are important and influence the type of psoriasis and response to treatment.     What are the signs and symptoms of psoriasis?    There are many different types of psoriasis that each have present uniquely. The types of psoriasis include:    Plaque psoriasis: About 80% to 90% of people who have psoriasis develop this type. When plaque psoriasis appears, you may see:  Plaque psoriasis usually presents with symmetrically distributed, red, scaly plaques with well-defined edges. The scale is typically silvery white, except in skin folds where the plaques often appear shiny and they may have a moist peeling surface. The most common sites are scalp, elbows and knees, but any part of the skin can be  involved. The plaques are usually very persistent without treatment.  Itch is mostly mild but may be severe in some patients, leading to scratching and lichenification (thickened leathery skin with increased skin markings). Painful skin cracks or fissures may occur.  When psoriatic plaques clear up, they may leave brown or pale marks that can be expected to fade over several months.    Guttate psoriasis: When someone gets this type of psoriasis, you often see tiny bumps appear on the skin quite suddenly. The bumps tend to cover much of the torso, legs, and arms. Sometimes, the bumps also develop on the face, scalp, and ears. No matter where they appear, the bumps tend to be:   Small and scaly  [place]-colored to pink  Temporary, clearing in a few weeks or months without treatment  When guttate psoriasis clears, it may never return. Why this happens is still a bit of a mystery. Guttate psoriasis tends to develop in children and young adults who've had an infection, such as strep throat. It's possible that when the infection clears so does guttate psoriasis.  It's also possible to have:  Guttate psoriasis for life  See the guttate psoriasis clear and plaque psoriasis develop later in life  Plaque psoriasis when you develop guttate psoriasis  There's no way to predict what will happen after the first flare-up of guttate psoriasis clears.    Inverse psoriasis: This type of psoriasis develops in areas where skin touches skin, such as the armpits, genitals, and crease of the buttocks. Where the inverse psoriasis appears, you're likely to notice:  Smooth, red patches of skin that look raw  Little, if any, silvery-white coating  Sore or painful skin  Other names for this type of psoriasis are intertriginous psoriasis or flexural psoriasis.  Pustular psoriasis: This type of psoriasis causes pus-filled bumps that usually appear only on the feet and hands. While the pus-filled bumps may look like an infection, the skin is not  infected. The bumps don't contain bacteria or anything else that could cause an infection.  Where pustular psoriasis appears, you tend to notice:  Red, swollen skin that is dotted with pus-filled bumps  Extremely sore or painful skin  Brown dots (and sometimes scale) appear as the pus-filled bumps dry  Pustular psoriasis can make just about any activity that requires your hands or feet, such as typing or walking, unbearably painful.    Pustular psoriasis (generalized): Serious and life-threatening, this rare type of psoriasis causes pus-filled bumps to develop on much of the skin. Also called von Zumbusch psoriasis, a flare-up causes this sequence of events:  Skin on most of the body suddenly turns dry, red, and tender.  Within hours, pus-filled bumps cover most of the skin.  Often within a day, the pus-filled bumps break open and pools of pus leak onto the skin.  As the pus dries (usually within 24 to 48 hours), the skin dries out and peels (as shown in this picture).  When the dried skin peels off, you see a smooth, glazed surface.  In a few days or weeks, you may see a new crop of pus-filled bumps covering most of the skin, as the cycle repeats itself.  Anyone with pustular psoriasis also feels very sick, and may develop a fever, headache, muscle weakness, and other symptoms. Medical care is often necessary to save the person's life.    Erythrodermic psoriasis: Serious and life-threatening, this type of psoriasis requires immediate medical care. When someone develops erythrodermic psoriasis, you may notice:  Skin on most of the body looks burnt  Chills, fever, and the person looks extremely ill  Muscle weakness, a rapid pulse, and severe itch  The person may also be unable to keep warm, so hypothermia can set in quickly.  Most people who develop this type of psoriasis already have another type of psoriasis. Before developing erythrodermic psoriasis, they often notice that their psoriasis is worsening or not  improving with treatment. If you notice either of these happening, see a board-certified dermatologist.    Nails    Nail psoriasis: With any type of psoriasis, you may see changes to your fingernails or toenails. About half of the people who have plaque psoriasis see signs of psoriasis on their fingernails at some point2.  When psoriasis affects the nails, you may notice:  Tiny dents in your nails (called “nail pits”)  White, yellow, or brown discoloration under one or more nails  Crumbling, rough nails  A nail lifting up so that it's no longer attached  Buildup of skin cells beneath one or more nails, which lifts up the nail  Treatment and proper nail care can help you control nail psoriasis.    Psoriatic arthritis: If you have psoriasis, it's important to pay attention to your joints. Some people who have psoriasis develop a type of arthritis called psoriatic arthritis. This is more likely to occur if you have severe psoriasis.  Most people notice psoriasis on their skin years before they develop psoriatic arthritis. It's also possible to get psoriatic arthritis before psoriasis, but this is less common.  When psoriatic arthritis develops, the signs can be subtle. At first, you may notice:  A swollen and tender joint, especially in a finger or toe  Heel pain  Swelling on the back of your leg, just above your heel  Stiffness in the morning that fades during the day  Like psoriasis, psoriatic arthritis cannot be cured. Treatment can prevent psoriatic arthritis from worsening, which is important. Allowed to progress, psoriatic arthritis can become disabling.    Diagnosis and treatment of psoriasis   Psoriasis is usually diagnosed by clinical features, and skin biopsy if necessary.   It is important to decrease factors that aggravate psoriasis. These include treating streptococcal infections, minimizing skin injuries, avoiding sun exposure if it exacerbates psoriasis, smoking, alcohol usage, decreasing stress, and  maintaining an optimal body weight. Certain medications such as lithium, beta blockers, antimalarials, and NSAIDs have also been implicated. Suddenly stopping oral steroids or strong topical steroids can cause rebound disease.     There are many categories of psoriasis treatments available.     Topical therapy  Mild psoriasis is generally treated with topical agents alone. Which treatment is selected may depend on body site, extent and severity of psoriasis.  Emollients  Coal tar preparations  Dithranol  Salicylic acid  Vitamin D analogue (calcipotriol)  Topical corticosteroids  Calcineurin inhibitor (tacrolimus, pimecrolimus)  Phototherapy  Most psoriasis centres offer phototherapy with ultraviolet (UV) radiation, often in combination with topical or systemic agents. Types of phototherapy include  Narrowband UVB  Broadband UVB  Photochemotherapy (PUVA)  Targeted phototherapy  Systemic therapy  Moderate to severe psoriasis warrants treatment with a systemic agent and/or phototherapy. The most common treatments are:  Methotrexate  Ciclosporin  Acitretin  Other medicines occasionally used for psoriasis include:  Mycophenolate  Apremilast  Hydroxyurea  Azathioprine  6-mercaptopurine  Systemic corticosteroids are best avoided due to a risk of severe withdrawal flare of psoriasis and adverse effects.  Biologics or targeted therapies are reserved for conventional treatment-resistant severe psoriasis, mainly because of expense, as side effects compare favorably with other systemic agents. These include:  Anti-tumour necrosis factor-alpha antagonists (anti-TNF?) infliximab, adalimumab and etanercept  The interleukin (IL)-12/23 antagonist ustekinumab  IL-17 antagonists such as secukinumab  Many other monoclonal antibodies are under investigation in the treatment of psoriasis.

## 2025-01-29 NOTE — PROGRESS NOTES
"[facility] Note     Patient Name: Ann Rowe  Encounter Date: 1/29/25     Have you been cared for by a [facility] Dermatologist in the last 3 years and, if so, which description applies to you?    Yes.  I have been here within the last 3 years, and my medical history has NOT changed since that time.  I am FEMALE/of child-bearing potential.    REVIEW OF SYSTEMS:  Have you recently had or currently have any of the following? No changes in my recent health.   PAST MEDICAL HISTORY:  Have you personally ever had or currently have any of the following?  If \"YES,\" then please provide more detail. No changes in my medical history.   HISTORY OF IMMUNOSUPPRESSION: Do you have a history of any of the following:  Systemic Immunosuppression such as Diabetes, Biologic or Immunotherapy, Chemotherapy, Organ Transplantation, Bone Marrow Transplantation or Prednisone?  YES, Taltz     Answering \"YES\" requires the addition of the dotphrase \"IMMUNOSUPPRESSED\" as the first diagnosis of the patient's visit.   FAMILY HISTORY:  Any \"first degree relatives\" (parent, brother, sister, or child) with the following?    No changes in my family's known health.   PATIENT EXPERIENCE:    Do you want the Dermatologist to perform a COMPLETE skin exam today including a clinical examination under the \"bra and underwear\" areas?  NO  If necessary, do we have your permission to call and leave a detailed message on your Preferred Phone number that includes your specific medical information?  Yes      Allergies   Allergen Reactions   • Bupropion Other (See Comments)   • Carbidopa W-Levodopa Other (See Comments)   • Lisinopril Other (See Comments)      Current Outpatient Medications:   •  atorvastatin (LIPITOR) 80 mg tablet, Take 80 mg by mouth daily at bedtime, Disp: , Rfl:   •  clobetasol (TEMOVATE) 0.05 % cream, 1 Application 2 (two) times a day To affected areas, Disp: , Rfl:   •  clopidogrel (PLAVIX) 75 mg tablet, Take 75 mg by mouth " daily, Disp: , Rfl:   •  diltiazem (CARDIZEM CD) 120 mg 24 hr capsule, Take 120 mg by mouth daily, Disp: , Rfl:   •  escitalopram (Lexapro) 10 mg tablet, Escitalopram 10m tablet po daily, Disp: 30 tablet, Rfl: 1  •  furosemide (LASIX) 40 mg tablet, Take 40 mg by mouth daily, Disp: , Rfl:   •  ixekizumab (Taltz) 80 MG/ML subcutaneous injection, Inject 80mg (1 pen) under the skin every 4 weeks., Disp: 1 mL, Rfl: 11  •  levothyroxine 25 mcg tablet, Take 25 mcg by mouth daily, Disp: , Rfl:   •  LORazepam (ATIVAN) 0.5 mg tablet, Take 0.5 mg by mouth daily at bedtime, Disp: , Rfl:   •  losartan (COZAAR) 50 mg tablet, Take 50 mg by mouth daily, Disp: , Rfl:   •  metFORMIN (GLUCOPHAGE) 500 mg tablet, Take 500 mg by mouth 2 (two) times a day with meals, Disp: , Rfl:   •  pramipexole (MIRAPEX) 1 mg tablet, Take 1 mg by mouth, Disp: , Rfl:   •  prasugrel (EFFIENT) tablet, Take 60 mg by mouth, Disp: , Rfl:   •  traZODone (DESYREL) 50 mg tablet, Trazodone 50m-2 tablet po at night, Disp: 180 tablet, Rfl: 0  •  triamcinolone (KENALOG) 0.1 % cream, Apply topically 2 (two) times a day For up to two weeks or until patch on belly button clears, then take a 1 week break before restarting for future flares., Disp: 80 g, Rfl: 1  •  isosorbide mononitrate (IMDUR) 30 mg 24 hr tablet, Take 30 mg by mouth daily (Patient not taking: Reported on 2025), Disp: , Rfl:   •  Ixekizumab (Taltz) 80 MG/ML SOAJ, Inject 160mg (2 pens) subcutaneously on week 0 then 80 mg (1 pen) on week 2 and 4 for loading dose. (Patient not taking: Reported on 2025), Disp: 4 mL, Rfl: 0  •  metoprolol succinate (TOPROL-XL) 25 mg 24 hr tablet, Take 25 mg by mouth 2 (two) times a day (Patient not taking: Reported on 2025), Disp: , Rfl:   •  risperiDONE (RisperDAL) 1 mg tablet, Risperidone 1 mg : 1 tablet in the morning x 1 wk. Then 1 tablet twice daily ( morning and night) (Patient not taking: Reported on 2025), Disp: 60 tablet, Rfl: 1  •   rOPINIRole (REQUIP) 0.25 mg tablet, Take 0.25 mg by mouth daily at bedtime (Patient not taking: Reported on 1/29/2025), Disp: , Rfl:           Whom besides the patient is providing clinical information about today's encounter?   NO ADDITIONAL HISTORIAN (patient alone provided history)    Physical Exam and Assessment/Plan by Diagnosis:    PSORIASIS     Physical Exam:  Anatomic Location Affected:  umbilicus  Morphological Description:  small scaly erythematous plaque surrounding umbilicus  Severity: mild  Body Percent Affected: <1%    Additional History of Present Condition:  Patient is doing well with Taltz injections. She has previously tried topical steroids with no improvement. Psoriasis clear except small residual area around belly button. Using gold bond lotion to moisturize    Assessment and Plan:  Based on a thorough discussion of this condition and the management approach to it (including a comprehensive discussion of the known risks, side effects and potential benefits of treatment), the patient (family) agrees to implement the following specific plan:  Taltz PA to be renewed.  CBC, CMP, and Quant TB ordered. [facility] that she will see if included CBC or CMP.   Recommend thicker cream such as Cerave Moisturizing cream, Vanicream, or Cetaphil Cream.   Triamcinolone 0.1% cream can be used BID for two weeks at a time to residual patch around umbilicus when flared   [facility] Cannot see all recent labs in chart. Most recent are from hospital admission in July.      Psoriasis is a chronic inflammatory condition that causes the body to make new skin cells in days rather than weeks. As these cells pile up on the surface of the skin, you may see thick, scaly patches of thickened skin.   Psoriasis affects 2-4% of males and females. It can start at any age including childhood, with peaks of onset at 15-25 years and 50-60 years. It  tends to persist lifelong, fluctuating in extent and severity. It is particularly common in Caucasians but may affect people of any race. About one-third of patients with psoriasis have family members with psoriasis.  Psoriasis is multifactorial. It is classified as an immune-mediated inflammatory disease (IMID). Genetic factors are important and influence the type of psoriasis and response to treatment.     What are the signs and symptoms of psoriasis?    There are many different types of psoriasis that each have present uniquely. The types of psoriasis include:    Plaque psoriasis: About 80% to 90% of people who have psoriasis develop this type. When plaque psoriasis appears, you may see:  Plaque psoriasis usually presents with symmetrically distributed, red, scaly plaques with well-defined edges. The scale is typically silvery white, except in skin folds where the plaques often appear shiny and they may have a moist peeling surface. The most common sites are scalp, elbows and knees, but any part of the skin can be involved. The plaques are usually very persistent without treatment.  Itch is mostly mild but may be severe in some patients, leading to scratching and lichenification (thickened leathery skin with increased skin markings). Painful skin cracks or fissures may occur.  When psoriatic plaques clear up, they may leave brown or pale marks that can be expected to fade over several months.    Guttate psoriasis: When someone gets this type of psoriasis, you often see tiny bumps appear on the skin quite suddenly. The bumps tend to cover much of the torso, legs, and arms. Sometimes, the bumps also develop on the face, scalp, and ears. No matter where they appear, the bumps tend to be:   Small and scaly  [place]-colored to pink  Temporary, clearing in a few weeks or months without treatment  When guttate psoriasis clears, it may never return. Why this happens is still a bit of a mystery. Guttate psoriasis tends to  develop in children and young adults who've had an infection, such as strep throat. It's possible that when the infection clears so does guttate psoriasis.  It's also possible to have:  Guttate psoriasis for life  See the guttate psoriasis clear and plaque psoriasis develop later in life  Plaque psoriasis when you develop guttate psoriasis  There's no way to predict what will happen after the first flare-up of guttate psoriasis clears.    Inverse psoriasis: This type of psoriasis develops in areas where skin touches skin, such as the armpits, genitals, and crease of the buttocks. Where the inverse psoriasis appears, you're likely to notice:  Smooth, red patches of skin that look raw  Little, if any, silvery-white coating  Sore or painful skin  Other names for this type of psoriasis are intertriginous psoriasis or flexural psoriasis.  Pustular psoriasis: This type of psoriasis causes pus-filled bumps that usually appear only on the feet and hands. While the pus-filled bumps may look like an infection, the skin is not infected. The bumps don't contain bacteria or anything else that could cause an infection.  Where pustular psoriasis appears, you tend to notice:  Red, swollen skin that is dotted with pus-filled bumps  Extremely sore or painful skin  Brown dots (and sometimes scale) appear as the pus-filled bumps dry  Pustular psoriasis can make just about any activity that requires your hands or feet, such as typing or walking, unbearably painful.    Pustular psoriasis (generalized): Serious and life-threatening, this rare type of psoriasis causes pus-filled bumps to develop on much of the skin. Also called von Zumbusch psoriasis, a flare-up causes this sequence of events:  Skin on most of the body suddenly turns dry, red, and tender.  Within hours, pus-filled bumps cover most of the skin.  Often within a day, the pus-filled bumps break open and pools of pus leak onto the skin.  As the pus dries (usually within 24 to  48 hours), the skin dries out and peels (as shown in this picture).  When the dried skin peels off, you see a smooth, glazed surface.  In a few days or weeks, you may see a new crop of pus-filled bumps covering most of the skin, as the cycle repeats itself.  Anyone with pustular psoriasis also feels very sick, and may develop a fever, headache, muscle weakness, and other symptoms. Medical care is often necessary to save the person's life.    Erythrodermic psoriasis: Serious and life-threatening, this type of psoriasis requires immediate medical care. When someone develops erythrodermic psoriasis, you may notice:  Skin on most of the body looks burnt  Chills, fever, and the person looks extremely ill  Muscle weakness, a rapid pulse, and severe itch  The person may also be unable to keep warm, so hypothermia can set in quickly.  Most people who develop this type of psoriasis already have another type of psoriasis. Before developing erythrodermic psoriasis, they often notice that their psoriasis is worsening or not improving with treatment. If you notice either of these happening, see a board-certified dermatologist.    Nails    Nail psoriasis: With any type of psoriasis, you may see changes to your fingernails or toenails. About half of the people who have plaque psoriasis see signs of psoriasis on their fingernails at some point2.  When psoriasis affects the nails, you may notice:  Tiny dents in your nails (called “nail pits”)  White, yellow, or brown discoloration under one or more nails  Crumbling, rough nails  A nail lifting up so that it's no longer attached  Buildup of skin cells beneath one or more nails, which lifts up the nail  Treatment and proper nail care can help you control nail psoriasis.    Psoriatic arthritis: If you have psoriasis, it's important to pay attention to your joints. Some people who have psoriasis develop a type of arthritis called psoriatic arthritis. This is more likely to occur if you  have severe psoriasis.  Most people notice psoriasis on their skin years before they develop psoriatic arthritis. It's also possible to get psoriatic arthritis before psoriasis, but this is less common.  When psoriatic arthritis develops, the signs can be subtle. At first, you may notice:  A swollen and tender joint, especially in a finger or toe  Heel pain  Swelling on the back of your leg, just above your heel  Stiffness in the morning that fades during the day  Like psoriasis, psoriatic arthritis cannot be cured. Treatment can prevent psoriatic arthritis from worsening, which is important. Allowed to progress, psoriatic arthritis can become disabling.    Diagnosis and treatment of psoriasis   Psoriasis is usually diagnosed by clinical features, and skin biopsy if necessary.   It is important to decrease factors that aggravate psoriasis. These include treating streptococcal infections, minimizing skin injuries, avoiding sun exposure if it exacerbates psoriasis, smoking, alcohol usage, decreasing stress, and maintaining an optimal body weight. Certain medications such as lithium, beta blockers, antimalarials, and NSAIDs have also been implicated. Suddenly stopping oral steroids or strong topical steroids can cause rebound disease.     There are many categories of psoriasis treatments available.     Topical therapy  Mild psoriasis is generally treated with topical agents alone. Which treatment is selected may depend on body site, extent and severity of psoriasis.  Emollients  Co[place] tar preparations  Dithranol  Salicylic acid  Vitamin D analogue (calcipotriol)  Topical corticosteroids  Calcineurin inhibitor (tacrolimus, pimecrolimus)  Phototherapy  Most psoriasis centres offer phototherapy with ultraviolet (UV) radiation, often in combination with topical or systemic agents. Types of phototherapy include  Narrowband UVB  Broadband UVB  Photochemotherapy (PUVA)  Targeted phototherapy  Systemic therapy  Moderate to  severe psoriasis warrants treatment with a systemic agent and/or phototherapy. The most common treatments are:  Methotrexate  Ciclosporin  Acitretin  Other medicines occasionally used for psoriasis include:  Mycophenolate  Apremilast  Hydroxyurea  Azathioprine  6-mercaptopurine  Systemic corticosteroids are best avoided due to a risk of severe withdrawal flare of psoriasis and adverse effects.  Biologics or targeted therapies are reserved for conventional treatment-resistant severe psoriasis, mainly because of expense, as side effects compare favorably with other systemic agents. These include:  Anti-tumour necrosis factor-alpha antagonists (anti-TNFa) infliximab, adalimumab and etanercept  The interleukin (IL)-12/23 antagonist ustekinumab  IL-17 antagonists such as secukinumab  Many other monoclonal antibodies are under investigation in the treatment of psoriasis.    Scribe Attestation    I,:  Alex Thapa am acting as a scribe while in the presence of the attending physician.:       I,:  Nadira Guzman PA-C personally performed the services described in this documentation    as scribed in my presence.:

## 2025-01-30 ENCOUNTER — TELEPHONE (OUTPATIENT)
Age: 60
End: 2025-01-30

## 2025-01-30 NOTE — TELEPHONE ENCOUNTER
PA for Taltz 80mg auto injector SUBMITTED to [employer]     via    []CMM-KEY:   [x]Surescripts-Case ID # [id number]     []Availity-Auth ID # NDC #   []Faxed to plan   []Other website   []Phone call Case ID #     [x]PA sent as URGENT    All office notes, labs and other pertaining documents and studies sent. Clinical questions answered. Awaiting determination from insurance company.     Turnaround time for your insurance to make a decision on your Prior Authorization can take 7-21 business days.

## 2025-01-31 NOTE — TELEPHONE ENCOUNTER
PA for Taltz 80mg auto injector  APPROVED     Date(s) approved 01/30/2025 - 01/30/2026    Case #[id number]     Patient advised by          []MyChart Message  [x]Phone call   []LMOM  []L/M to call office as no active Communication consent on file  []Unable to leave detailed message as VM not approved on Communication consent       Pharmacy advised by    [x]Fax  []Phone call    Approval letter scanned into Media Yes

## 2025-02-08 LAB
ALBUMIN SERPL-MCNC: 4.1 G/DL (ref 3.9–4.9)
ALP SERPL-CCNC: 123 IU/L (ref 44–121)
ALT SERPL-CCNC: 19 IU/L (ref 0–32)
AST SERPL-CCNC: 25 IU/L (ref 0–40)
BASOPHILS # BLD AUTO: 0 X10E3/UL (ref 0–0.2)
BASOPHILS NFR BLD AUTO: 1 %
BILIRUB SERPL-MCNC: 0.3 MG/DL (ref 0–1.2)
BUN SERPL-MCNC: 24 MG/DL (ref 8–27)
BUN/CREAT SERPL: 22 (ref 12–28)
CALCIUM SERPL-MCNC: 9.4 MG/DL (ref 8.7–10.3)
CHLORIDE SERPL-SCNC: 104 MMOL/L (ref 96–106)
CO2 SERPL-SCNC: 21 MMOL/L (ref 20–29)
CREAT SERPL-MCNC: 1.09 MG/DL (ref 0.57–1)
EGFR: 58 ML/MIN/1.73
EOSINOPHIL # BLD AUTO: 0.4 X10E3/UL (ref 0–0.4)
EOSINOPHIL NFR BLD AUTO: 5 %
ERYTHROCYTE [DISTWIDTH] IN BLOOD BY AUTOMATED COUNT: 15.4 % (ref 11.7–15.4)
GLOBULIN SER-MCNC: 2.9 G/DL (ref 1.5–4.5)
GLUCOSE SERPL-MCNC: 107 MG/DL (ref 70–99)
HCT VFR BLD AUTO: 32.5 % (ref 34–46.6)
HGB BLD-MCNC: 10.7 G/DL (ref 11.1–15.9)
IMM GRANULOCYTES # BLD: 0 X10E3/UL (ref 0–0.1)
IMM GRANULOCYTES NFR BLD: 0 %
LYMPHOCYTES # BLD AUTO: 2.4 X10E3/UL (ref 0.7–3.1)
LYMPHOCYTES NFR BLD AUTO: 33 %
MCH RBC QN AUTO: 29.3 PG (ref 26.6–33)
MCHC RBC AUTO-ENTMCNC: 32.9 G/DL (ref 31.5–35.7)
MCV RBC AUTO: 89 FL (ref 79–97)
MONOCYTES # BLD AUTO: 0.5 X10E3/UL (ref 0.1–0.9)
MONOCYTES NFR BLD AUTO: 7 %
NEUTROPHILS # BLD AUTO: 4 X10E3/UL (ref 1.4–7)
NEUTROPHILS NFR BLD AUTO: 54 %
PLATELET # BLD AUTO: 218 X10E3/UL (ref 150–450)
POTASSIUM SERPL-SCNC: 4.3 MMOL/L (ref 3.5–5.2)
PROT SERPL-MCNC: 7 G/DL (ref 6–8.5)
RBC # BLD AUTO: 3.65 X10E6/UL (ref 3.77–5.28)
SODIUM SERPL-SCNC: 142 MMOL/L (ref 134–144)
WBC # BLD AUTO: 7.4 X10E3/UL (ref 3.4–10.8)

## 2025-02-17 ENCOUNTER — RESULTS FOLLOW-UP (OUTPATIENT)
Dept: DERMATOLOGY | Facility: CLINIC | Age: 60
End: 2025-02-17

## 2025-02-17 NOTE — RESULT ENCOUNTER NOTE
Labs reviewed. RBC, Hgb and Hct slightly low. Creatinine elevated and GFR decreased compared to most recent labs 7mo ago. Will have patient follow up with PCP    I attempted to call home and mobile phone and could not get through to leave a voicemail. Will try again later today. No mychart set up.     Quant TB test does not appear to have been completed by lab.

## 2025-02-17 NOTE — TELEPHONE ENCOUNTER
Rec'd return call from patient. I advised of previous message from Nadira.    Patient verbalized understanding and will wait for call back. No further questions at this time.

## 2025-04-01 ENCOUNTER — OFFICE VISIT (OUTPATIENT)
Dept: OBGYN CLINIC | Facility: CLINIC | Age: 60
End: 2025-04-01
Payer: COMMERCIAL

## 2025-04-01 VITALS
SYSTOLIC BLOOD PRESSURE: 116 MMHG | BODY MASS INDEX: 36.73 KG/M2 | WEIGHT: 175 LBS | DIASTOLIC BLOOD PRESSURE: 64 MMHG | HEIGHT: 58 IN

## 2025-04-01 DIAGNOSIS — N63.23 MASS OF LOWER OUTER QUADRANT OF LEFT BREAST: Primary | ICD-10-CM

## 2025-04-01 PROCEDURE — 99213 OFFICE O/P EST LOW 20 MIN: CPT | Performed by: OBSTETRICS & GYNECOLOGY

## 2025-04-01 RX ORDER — ASPIRIN 81 MG/1
81 TABLET, CHEWABLE ORAL DAILY
COMMUNITY

## 2025-04-14 ENCOUNTER — TELEPHONE (OUTPATIENT)
Age: 60
End: 2025-04-14

## 2025-04-14 DIAGNOSIS — G47.33 OSA (OBSTRUCTIVE SLEEP APNEA): Primary | ICD-10-CM

## 2025-04-14 NOTE — TELEPHONE ENCOUNTER
"Called Pt and spoke with her regarding CPAP Supplies. Informed her order was faxed to [facility]. Also stated office does not do servicing, for that she would need to call the DME and have them set it up, as office does not deal with service. Pt stated DME told her they need an \"order so they can come take mine and give me one to use as they fix it.\" MA stated he has not heard of that before but will reach out to [facility] to see what they mean.  "

## 2025-04-14 NOTE — TELEPHONE ENCOUNTER
Patient called in stating that she has a cpap machine from her old Pulmonologist who no longer practices, but patient states that she needs her cpap serviced like cleaned and needs new supplies. She asked if we would be able to send the orders to [email or web address] which is her [employer] company. Please advise.

## 2025-04-15 DIAGNOSIS — G47.33 OSA (OBSTRUCTIVE SLEEP APNEA): Primary | ICD-10-CM

## 2025-04-15 NOTE — TELEPHONE ENCOUNTER
"Reached out to [facility] regarding Pt's machine needing a script for repair. The following is the reply:      I am unsure if a script such as the one requested can be written or if in the \"notes\" it can be placed.   Sending to Provider to review.  "

## 2025-04-21 ENCOUNTER — TELEPHONE (OUTPATIENT)
Age: 60
End: 2025-04-21

## 2025-04-21 NOTE — PROGRESS NOTES
Name: Ann Rowe      : 1965      MRN: [id number]  Encounter Provider: Brian Damon MD  Encounter Date: 2025   Encounter department: [facility] [place]  :  Assessment & Plan  Mass of lower outer quadrant of left breast  While she is due for a screening mammography due to the palpable mass a diagnostic mammogram is ordered. She can follow up at her wellness for the screening order  Orders:    Mammo diagnostic left w cad; Future        History of Present Illness   HPI  Ann Rowe is a 60 y.o. female who presents with recently noticed palpable non tender mobile mass in her left breast. Her last mammography was in  and was birads 2.   History obtained from: patient    Review of Systems  Current Outpatient Medications on File Prior to Visit   Medication Sig Dispense Refill    aspirin 81 mg chewable tablet Chew 81 mg daily      atorvastatin (LIPITOR) 80 mg tablet Take 80 mg by mouth daily at bedtime      clobetasol (TEMOVATE) 0.05 % cream 1 Application 2 (two) times a day To affected areas      clopidogrel (PLAVIX) 75 mg tablet Take 75 mg by mouth daily      diltiazem (CARDIZEM CD) 120 mg 24 hr capsule Take 120 mg by mouth daily      escitalopram (Lexapro) 10 mg tablet Escitalopram 10m tablet po daily 30 tablet 1    furosemide (LASIX) 40 mg tablet Take 40 mg by mouth daily      ixekizumab (Taltz) 80 MG/ML subcutaneous injection Inject 80mg (1 pen) under the skin every 4 weeks. 1 mL 11    levothyroxine 25 mcg tablet Take 25 mcg by mouth daily      LORazepam (ATIVAN) 0.5 mg tablet Take 0.5 mg by mouth daily at bedtime      losartan (COZAAR) 50 mg tablet Take 50 mg by mouth daily      metFORMIN (GLUCOPHAGE) 500 mg tablet Take 500 mg by mouth 2 (two) times a day with meals      pramipexole (MIRAPEX) 1 mg tablet Take 1 mg by mouth      prasugrel (EFFIENT) tablet Take 60 mg by mouth      traZODone (DESYREL) 50 mg tablet Trazodone 50m-2 tablet po at night 180 tablet 0     "triamcinolone (KENALOG) 0.1 % cream Apply topically 2 (two) times a day For up to two weeks or until patch on belly button clears, then take a 1 week break before restarting for future flares. 80 g 1    isosorbide mononitrate (IMDUR) 30 mg 24 hr tablet Take 30 mg by mouth daily (Patient not taking: Reported on 4/1/2025)      Ixekizumab (Taltz) 80 MG/ML SOAJ Inject 160mg (2 pens) subcutaneously on week 0 then 80 mg (1 pen) on week 2 and 4 for loading dose. (Patient not taking: Reported on 4/1/2025) 4 mL 0    metoprolol succinate (TOPROL-XL) 25 mg 24 hr tablet Take 25 mg by mouth 2 (two) times a day (Patient not taking: Reported on 4/1/2025)      risperiDONE (RisperDAL) 1 mg tablet Risperidone 1 mg : 1 tablet in the morning x 1 wk. Then 1 tablet twice daily ( morning and night) (Patient not taking: Reported on 4/1/2025) 60 tablet 1    rOPINIRole (REQUIP) 0.25 mg tablet Take 0.25 mg by mouth daily at bedtime (Patient not taking: Reported on 11/15/2023)       No current facility-administered medications on file prior to visit.      Social History     Tobacco Use    Smoking status: Never     Passive exposure: Never    Smokeless tobacco: Never   Vaping Use    Vaping status: Never Used   Substance and Sexual Activity    Alcohol use: Not Currently    Drug use: Not Currently    Sexual activity: Not Currently     Partners: Female     Birth control/protection: Post-menopausal        Objective   /64 (BP Location: Left arm, Patient Position: Sitting, Cuff Size: Standard)   Ht 4' 10\" (1.473 m)   Wt 79.4 kg (175 lb)   BMI 36.58 kg/m²      Physical Exam      "

## 2025-05-01 ENCOUNTER — TELEPHONE (OUTPATIENT)
Dept: OBGYN CLINIC | Facility: CLINIC | Age: 60
End: 2025-05-01

## 2025-05-01 DIAGNOSIS — N63.23 MASS OF LOWER OUTER QUADRANT OF LEFT BREAST: Primary | ICD-10-CM

## 2025-05-01 DIAGNOSIS — Z12.31 ENCOUNTER FOR SCREENING MAMMOGRAM FOR MALIGNANT NEOPLASM OF BREAST: ICD-10-CM

## 2025-05-01 NOTE — TELEPHONE ENCOUNTER
Radha,[facility]  contacted office to obtain bilateral Diagn. Mammogram order for pt.. Ann was currently at [facility] for Left Diagn. Mammogram testing. This order per Radha was save pt time as her last Screening Mammogram was two years ago. Radiologist would review imagines today for pt..Diagn.Bilateral Mammogram order was faxed to Radha @ [phone], received fax confirmation.

## 2025-05-02 DIAGNOSIS — Z12.31 ENCOUNTER FOR SCREENING MAMMOGRAM FOR MALIGNANT NEOPLASM OF BREAST: ICD-10-CM

## 2025-05-02 DIAGNOSIS — N63.23 MASS OF LOWER OUTER QUADRANT OF LEFT BREAST: ICD-10-CM

## 2025-08-06 ENCOUNTER — TELEPHONE (OUTPATIENT)
Age: 60
End: 2025-08-06

## 2025-08-06 DIAGNOSIS — R06.02 SHORTNESS OF BREATH: Primary | ICD-10-CM

## 2025-08-06 DIAGNOSIS — M79.89 SWELLING OF LOWER EXTREMITY: ICD-10-CM

## 2025-08-07 RX ORDER — PRASUGREL 10 MG/1
10 TABLET, FILM COATED ORAL DAILY
Qty: 30 TABLET | Refills: 0 | OUTPATIENT
Start: 2025-08-07

## 2025-08-07 RX ORDER — PRASUGREL 10 MG/1
10 TABLET, FILM COATED ORAL DAILY
Qty: 90 TABLET | Refills: 3 | Status: SHIPPED | OUTPATIENT
Start: 2025-08-07

## 2025-08-22 DIAGNOSIS — I10 ESSENTIAL HYPERTENSION: Primary | ICD-10-CM

## 2025-08-22 RX ORDER — LOSARTAN POTASSIUM 50 MG/1
50 TABLET ORAL DAILY
Qty: 90 TABLET | Refills: 3 | Status: SHIPPED | OUTPATIENT
Start: 2025-08-22